# Patient Record
Sex: FEMALE | Race: WHITE
[De-identification: names, ages, dates, MRNs, and addresses within clinical notes are randomized per-mention and may not be internally consistent; named-entity substitution may affect disease eponyms.]

---

## 2018-03-06 ENCOUNTER — HOSPITAL ENCOUNTER (OUTPATIENT)
Dept: HOSPITAL 80 - CED | Age: 73
Setting detail: OBSERVATION
LOS: 1 days | Discharge: HOME | End: 2018-03-07
Attending: INTERNAL MEDICINE | Admitting: INTERNAL MEDICINE
Payer: COMMERCIAL

## 2018-03-06 DIAGNOSIS — J96.01: ICD-10-CM

## 2018-03-06 DIAGNOSIS — D69.6: ICD-10-CM

## 2018-03-06 DIAGNOSIS — Z87.891: ICD-10-CM

## 2018-03-06 DIAGNOSIS — Z85.810: ICD-10-CM

## 2018-03-06 DIAGNOSIS — Z82.49: ICD-10-CM

## 2018-03-06 DIAGNOSIS — J10.1: Primary | ICD-10-CM

## 2018-03-06 DIAGNOSIS — E86.9: ICD-10-CM

## 2018-03-06 DIAGNOSIS — D72.819: ICD-10-CM

## 2018-03-06 DIAGNOSIS — E87.1: ICD-10-CM

## 2018-03-06 LAB — PLATELET # BLD: 125 10^3/UL (ref 150–400)

## 2018-03-06 PROCEDURE — G0378 HOSPITAL OBSERVATION PER HR: HCPCS

## 2018-03-06 PROCEDURE — 92610 EVALUATE SWALLOWING FUNCTION: CPT

## 2018-03-06 PROCEDURE — 71046 X-RAY EXAM CHEST 2 VIEWS: CPT

## 2018-03-06 RX ADMIN — OSELTAMIVIR PHOSPHATE SCH MG: 75 CAPSULE ORAL at 19:41

## 2018-03-06 NOTE — EDPHY
H & P


Stated Complaint: c/o Flu like s/s with SOB inc. since Sat


Time Seen by Provider: 03/06/18 12:47


HPI/ROS: 





CHIEF COMPLAINT:  Cough and fever





History by patient and daughter





HISTORY OF PRESENT ILLNESS:  72-year-old woman with a remote history of mouth 

cancer related to smoking 20 years ago no prior history of asthma or COPD 

presents complaining of 3 days of cough occasionally productive of yellow sputum

, general fatigue and well as and fever.  She denies any runny nose or sore 

throat.  She has had a decreased appetite.  She denies any nausea or vomiting.  

There has been no diarrhea or rash.  She has no known ill contacts.  She did 

not get a flu shot this year.  She does have somewhat of a chronic cough that 

her daughter states this is clearly worse and different now.  There is no 

pleuritic chest pain or other chest pain.  There is no leg pain or swelling.





REVIEW OF SYSTEMS:


As in HPI, and all other systems reviewed and are negative


Source: Patient





- Medical/Surgical History


Hx Asthma: No


Hx Chronic Respiratory Disease: No


Hx Diabetes: No


Hx Cardiac Disease: No


Hx Renal Disease: No


Hx Cirrhosis: No


Hx Alcoholism: No


Hx HIV/AIDS: No


Hx Splenectomy or Spleen Trauma: No


Other PMH: thyroid,oral ca,RA.  surg-elsy





- Social History


Smoking Status: Former smoker





- Physical Exam


Exam: 





General Appearance:  Alert, speaking full sentences, nontoxic-appearing.


Head: normocephalic, atraumatic


Eyes:  Pupils equal and round, reactive to light, no pallor or injection.  

Extraocular movements intact


Mouth:  Mucous membranes moist.


Respiratory:  Normal, effort, lungs with diffuse wheezes and rhonchi 

inspiratory and expiratory


Cardiovascular:  Regular rate and rhythm. S1, S2, no murmurs, gallops or rubs 

appreciated


Gastrointestinal:  Abdomen is soft and nontender, no masses, bowel sounds 

normal.


Back: No CVA tenderness, no bony tenderness


Neurological:  Awake, alert and oriented x 3, no pronator drift, normal gait, 

no pronator drift


Skin:  Warm and dry, no rashes.


Musculoskeletal:   No deformities or tenderness. Full range of motion


Extremities: no edema, no tenderness, DP2+ bilat


Psychiatric:  Patient has normal affect, there is no agitation.


Constitutional: 


 Initial Vital Signs











Temperature (C)  37 C   03/06/18 12:54


 


Heart Rate  88   03/06/18 12:54


 


Respiratory Rate  18 03/06/18 12:54


 


Blood Pressure  137/62 H  03/06/18 12:54


 


O2 Sat (%)  95   03/06/18 12:54








 











O2 Delivery Mode               Room Air














Allergies/Adverse Reactions: 


 





No Known Allergies Allergy (Verified 11/05/15 13:37)


 








Home Medications: 














 Medication  Instructions  Recorded


 


NK [No Known Home Meds]  03/06/18














Medical Decision Making





- Diagnostics


Imaging Results: 


 Imaging Impressions





Chest X-Ray  03/06/18 13:07


Impression:


1. No active cardiopulmonary disease seen.


2. Stable marked compression of T12. Prior DEXA scan revealed osteoporosis.











Imaging: I viewed and interpreted images myself


ED Course/Re-evaluation: 





72-year-old woman presents with fever malaise and cough and wheezing.  X-ray 

shows no evidence of acute infiltrate or pneumonia.  Flu swab is positive for 

influenza B. Patient is started on Tamiflu.  Patient was given a DuoNeb in the 

emergency department.  On re-evaluation she continued to be wheezy in her 

oxygen saturation was noted to be borderline at around 90% on room air.  

Patient was given a 2nd albuterol neb which she felt did not change her 

respiratory status and again she continue with persistent wheezes and 

borderline oxygen saturations of 89-90% on room air.  Labs were done and 

chemistries were unremarkable.  There is no hyponatremia.  Patient was given a 

3rd albuterol neb again with minimal change.  Again her oxygen saturation 

dropped as low as 87% when she got up to go to the bathroom.  They would 

improved to about 90 91% when she was at rest but as soon as she did any 

exertion including trying to drink her Gatorade it would drop back down into 

the 80s.  Given that the patient's wheezing was not improving with the 

nebulizer treatments and was persistently hypoxic we will go ahead and admit 

her to the hospital for further evaluation treatment.  I discussed this with 

the patient and her daughter who were agreeable to this plan.  I discussed the 

case with Dr. Mena at [facility] who agrees 

to admission and accepts the patient for transfer.





- Data Points


Laboratory Results: 


 Laboratory Results





 03/06/18 14:40 





 











  03/06/18 03/06/18 03/06/18





  14:40 14:40 12:50


 


WBC    3.37 10^3/uL L 10^3/uL  





    (3.80-9.50)  


 


RBC    4.63 10^6/uL 10^6/uL  





    (4.18-5.33)  


 


Hgb    13.6 g/dL g/dL  





    (12.6-16.3)  


 


POC Hgb  13.6 gm/dL gm/dL    





   (12.6-16.3)   


 


Hct    39.9 % %  





    (38.0-47.0)  


 


POC Hct  40 % %    





   (38-47)   


 


MCV    86.2 fL fL  





    (81.5-99.8)  


 


MCH    29.4 pg pg  





    (27.9-34.1)  


 


MCHC    34.1 g/dL g/dL  





    (32.4-36.7)  


 


RDW    14.1 % %  





    (11.5-15.2)  


 


Plt Count    125 10^3/uL L 10^3/uL  





    (150-400)  


 


MPV    10.7 fL fL  





    (8.7-11.7)  


 


Neut % (Auto)    54.0 % %  





    (39.3-74.2)  


 


Lymph % (Auto)    30.3 % %  





    (15.0-45.0)  


 


Mono % (Auto)    13.9 % H %  





    (4.5-13.0)  


 


Eos % (Auto)    1.2 % %  





    (0.6-7.6)  


 


Baso % (Auto)    0.3 % %  





    (0.3-1.7)  


 


Nucleat RBC Rel Count    0.0 % %  





    (0.0-0.2)  


 


Absolute Neuts (auto)    1.82 10^3/uL 10^3/uL  





    (1.70-6.50)  


 


Absolute Lymphs (auto)    1.02 10^3/uL 10^3/uL  





    (1.00-3.00)  


 


Absolute Monos (auto)    0.47 10^3/uL 10^3/uL  





    (0.30-0.80)  


 


Absolute Eos (auto)    0.04 10^3/uL 10^3/uL  





    (0.03-0.40)  


 


Absolute Basos (auto)    0.01 10^3/uL L 10^3/uL  





    (0.02-0.10)  


 


Absolute Nucleated RBC    0.00 10^3/uL 10^3/uL  





    (0-0.01)  


 


Immature Gran %    0.3 % %  





    (0.0-1.1)  


 


Immature Gran #    0.01 10^3/uL 10^3/uL  





    (0.00-0.10)  


 


POC Sodium  134 mEq/L L mEq/L    





   (135-145)   


 


POC Potassium  3.9 mEq/L mEq/L    





   (3.3-5.0)   


 


POC Chloride  99 mEq/L mEq/L    





   ()   


 


POC BUN  14 mg/dL mg/dL    





   (7-23)   


 


POC Creatinine  1.0 mg/dL mg/dL    





   (0.6-1.0)   


 


POC Glucose  116 mg/dL H mg/dL    





   ()   


 


Influenza A,B Rapid      POSITIVE FOR FLU B  H 





     (NEGATIVE) 











Medications Given: 


 








Discontinued Medications





Albuterol (Proventil Neb)  3 ml IH EDNOW ONE


   Stop: 03/06/18 14:22


   Last Admin: 03/06/18 14:31 Dose:  3 ml


Albuterol/Ipratropium (Duoneb)  3 ml IH EDNOW ONE


   Stop: 03/06/18 13:09


   Last Admin: 03/06/18 13:11 Dose:  3 ml


Sodium Chloride (Ns)  500 mls @ 1,000 mls/hr IV EDNOW ONE


   PRN Reason: Protocol


   Stop: 03/06/18 14:50


   Last Admin: 03/06/18 14:33 Dose:  500 mls


Oseltamivir Phosphate (Tamiflu)  75 mg PO EDNOW ONE


   Stop: 03/06/18 13:27


   Last Admin: 03/06/18 13:49 Dose:  75 mg





Point of Care Test Results: 


 











  03/06/18





  14:40


 


POC Sodium  134 L


 


POC Potassium  3.9


 


POC Chloride  99


 


POC BUN  14


 


POC Creatinine  1.0


 


POC Glucose  116 H














Departure





- Departure


Disposition: [facility] Inpatient Acute


Clinical Impression: 


 Influenza B, Hypoxia





Condition: Fair


Referrals: 


Unknown,Unknown [Primary Care Provider] - As per Instructions

## 2018-03-06 NOTE — GHP
[f 
rep st]



                                                            HISTORY AND PHYSICAL





DATE OF ADMISSION:  2018



CHIEF COMPLAINT:  Influenza B.



HISTORY:  Done by patient and daughter.



HISTORY OF PRESENT ILLNESS:  A 72-year-old female with a remote history of 
squamous cell carcinoma of tongue, 20-30 pack-year tobacco, presenting with 
upper respiratory infection symptoms.  Says she felt like she was getting a 
cold on Friday that included a cough with clear sputum.  She has felt feverish 
with chills and night sweats.  Has felt so fatigued that she cannot get out of 
bed.  She has had myalgias.  She has had minimal food to eat over the past 5 
days.  Intermittent diarrhea, describes more as loose.  No blood in that.  
Denies any ill contacts.  Reports increased shortness of breath over the last 
couple days, especially with walking.  Denies chest pain.



REVIEW OF SYSTEMS:  I completed a 10-point review of systems, negative except 
as noted in HPI.



PAST MEDICAL HISTORY:  Squamous cell carcinoma of tongue, status post XRT and 
surgery.



PAST SURGICAL HISTORY:  , bilateral shoulders, cholecystectomy, tongue 
surgery.



FAMILY HISTORY:  Dad with 4 MI's, throat cancer.  Mother was healthy,  of 
old age.



SOCIAL HISTORY:  Lives in [place] alone, but daughter is close by.  Smoked 25-
30 years at less than a pack a day.  No alcohol.  Occasional marijuana.



HOME MEDICATIONS:  Tums.



PHYSICAL EXAMINATION:  VITAL SIGNS:  Temperature 36.6, blood pressure 150/82, 
heart rate in the 80s, respirations 18.  Reported in low 80s in room air at 
[facility], now 94%.  GENERAL:  Sitting up in bed in no acute 
distress.  HEENT:  PERRLA.  Oropharynx mildly red, but no exudates.  CV:  
Regular rate and rhythm.  LUNGS:  Decreased breath sounds throughout.  A few 
expiratory wheezes on the left.  ABDOMEN:  Soft, nontender, nondistended.  
Positive bowel sounds.  :  No Lomas.  MUSCULOSKELETAL:  5/5 upper and lower 
extremity strength.  SKIN:  Poor skin turgor.  NEURO:  2 through 12 intact.  
PSYCH:  Alert and oriented x3.



LABORATORY DATA:  WBC 3.3, hemoglobin 13, hematocrit 39, platelets 125.  Sodium 
134, potassium 3.9, chloride 99, BUN 14, creatinine 1, glucose 116.



RADIOLOGY:  Chest x-ray as personally reviewed by me:  Hyperexpanded lung 
fields.  No opacity.



MICRO:  Positive influenza B.



ASSESSMENT AND PLAN:  

1.  Acute hypoxic respiratory failure:  Secondary to influenza B and tobacco 
history.  Will treat supportively with Tessalon Perles and Tamiflu.

2.  Suspected chronic obstructive pulmonary disease:  She does have mild 
wheezing on exam.  Quit smoking 20 years ago.  Declined prednisone.  Will 
continue DuoNeb here.

3.  Influenza B:  Tamiflu.

4.  Mild hypovolemic hyponatremia:  She is dry on exam.  Will give gentle IV 
fluids overnight.  

5.  Thrombocytopenia:  Mild.  May be secondary to acute illness.  Will repeat 
in the morning.  No evidence of bleeding.  

6.  History of squamous cell carcinoma:  Status post XRT and surgery.  

7.  Deep venous thrombosis prophylaxis:  Lovenox.

8.  Diet:  Regular.

DISPOSITION:  Patient warrants inpatient admission given acute hypoxic 
respiratory failure warranting pulse ox and DuoNeb..  May discharge in the 
morning if clinically improved.





Job #:  [id number]

MTDD

## 2018-03-07 VITALS — RESPIRATION RATE: 16 BRPM

## 2018-03-07 VITALS
OXYGEN SATURATION: 95 % | DIASTOLIC BLOOD PRESSURE: 80 MMHG | HEART RATE: 64 BPM | TEMPERATURE: 97.7 F | SYSTOLIC BLOOD PRESSURE: 126 MMHG

## 2018-03-07 LAB — PLATELET # BLD: 131 10^3/UL (ref 150–400)

## 2018-03-07 RX ADMIN — OSELTAMIVIR PHOSPHATE SCH MG: 75 CAPSULE ORAL at 07:20

## 2018-03-07 NOTE — GDS
[f rep st]



                                                             DISCHARGE SUMMARY





DISCHARGE DIAGNOSES:  

1.  Influenza B.

2.  Acute hypoxemic respiratory failure.

3.  Hypovolemic hyponatremia.

4.  Thrombocytopenia and leukopenia.

5.  Possible chronic obstructive pulmonary disease.



HISTORY OF PRESENT ILLNESS:  Briefly, the patient is a 72-year-old female with a remote history of sq
uamous cell carcinoma of the tongue.  She presented with upper respiratory infection symptoms, and it
 was found that she had influenza B.  Today she is feeling markedly better and is anxious to be disch
arged.



HOSPITAL COURSE:  

1.  Influenza B.  Will continue supportive treatment with Tamiflu and Tessalon Perles for her cough. 
 I reviewed her chest x-ray, shows nothing acute noted.

2.  Acute hypoxemic respiratory failure, resolved.

3.  Hypovolemic hyponatremia, resolved.

4.  Thrombocytopenia and leukopenia.  This could be due to her acute illness.  Further followup with 
her PCP.

5.  Possible chronic obstructive pulmonary disease.  She has an extensive history of smoking.



DISCHARGE CONDITION:  Stable.  Blood pressure is 126/80.  Heart rate is 64.  Respiratory rate is 16. 
 O2 sats on room air are 94%, temperature 36.5 Celsius.



DISCHARGE MEDICATIONS:  Please see the EMR.



DISCHARGE INSTRUCTIONS:  

1.  To take Tamiflu as instructed.

2.  If she develops worsening fever, chills, chest pain, or shortness of breath, return to the ER.

3.  To follow up with her PCP in regard to her low platelet count and low white blood cell count for 
followup.





Job #:  [id number]

## 2018-03-07 NOTE — HOSPPROG
Hospitalist Progress Note


Assessment/Plan: 





Patient is a 72-year-old female with remote history of squamous cell carcinoma 

of the tongue.  She presented with upper respiratory infection symptoms was 

noted that she had the influenza B. Today is my 1st encounter with the patient 

chart reviewed





* influenza B


-patient much improved today will discharge home and have her follow up with 

her primary care provider


-reviewed chest xray, nothing acute noted





* acute hypoxemic respiratory failure


-resolved





* hypovolemic hyponatremia


-resolved





* thrombocytopenia and leukopenia


-possibly from acute illness





*possible COPD


-long hx of smoking





*Plan: dc home, patient could not sleep last night


Subjective: Jane is feeling much improved today.


Objective: 


 Vital Signs











Temp Pulse Resp BP Pulse Ox


 


 36.5 C   64   16   126/80 H  95 


 


 03/07/18 07:58  03/07/18 07:58  03/07/18 07:58  03/07/18 07:58  03/07/18 07:58








 Laboratory Results





 03/07/18 05:38 





 03/07/18 05:38 





 











 03/06/18 03/07/18 03/08/18





 05:59 05:59 05:59


 


Intake Total  1300 


 


Balance  1300 














- Physical Exam


Constitutional: no apparent distress, appears nourished, not in pain


Eyes: PERRL


Ears, Nose, Mouth, Throat: hearing normal


Cardiovascular: regular rate and rhythym


Respiratory: no respiratory distress, clear to auscultation


Gastrointestinal: normoactive bowel sounds


Skin: warm


Musculoskeletal: full muscle strength


Neurologic: AAOx3


Psychiatric: interacting appropriately





ICD10 Worksheet


Patient Problems: 


 Problems











Problem Status Onset


 


Hypoxia Acute  


 


Influenza B Acute

## 2019-03-05 ENCOUNTER — HOSPITAL ENCOUNTER (OUTPATIENT)
Dept: HOSPITAL 80 - CED | Age: 74
Setting detail: OBSERVATION
LOS: 1 days | Discharge: HOME HEALTH | End: 2019-03-06
Attending: INTERNAL MEDICINE | Admitting: INTERNAL MEDICINE
Payer: COMMERCIAL

## 2019-03-05 DIAGNOSIS — E03.9: ICD-10-CM

## 2019-03-05 DIAGNOSIS — Z92.3: ICD-10-CM

## 2019-03-05 DIAGNOSIS — B97.81: ICD-10-CM

## 2019-03-05 DIAGNOSIS — J44.0: ICD-10-CM

## 2019-03-05 DIAGNOSIS — J44.1: Primary | ICD-10-CM

## 2019-03-05 DIAGNOSIS — Z87.891: ICD-10-CM

## 2019-03-05 DIAGNOSIS — Z85.810: ICD-10-CM

## 2019-03-05 DIAGNOSIS — E86.1: ICD-10-CM

## 2019-03-05 DIAGNOSIS — J20.8: ICD-10-CM

## 2019-03-05 LAB
INR PPP: 1.01 (ref 0.83–1.16)
PROTHROMBIN TIME: 12.9 SEC (ref 12–15)

## 2019-03-05 PROCEDURE — 96365 THER/PROPH/DIAG IV INF INIT: CPT

## 2019-03-05 PROCEDURE — 99285 EMERGENCY DEPT VISIT HI MDM: CPT

## 2019-03-05 PROCEDURE — G0378 HOSPITAL OBSERVATION PER HR: HCPCS

## 2019-03-05 PROCEDURE — 96376 TX/PRO/DX INJ SAME DRUG ADON: CPT

## 2019-03-05 PROCEDURE — 96375 TX/PRO/DX INJ NEW DRUG ADDON: CPT

## 2019-03-05 PROCEDURE — 97165 OT EVAL LOW COMPLEX 30 MIN: CPT

## 2019-03-05 PROCEDURE — 71046 X-RAY EXAM CHEST 2 VIEWS: CPT

## 2019-03-05 RX ADMIN — IPRATROPIUM BROMIDE AND ALBUTEROL SULFATE SCH: .5; 3 SOLUTION RESPIRATORY (INHALATION) at 21:19

## 2019-03-05 RX ADMIN — IPRATROPIUM BROMIDE AND ALBUTEROL SULFATE SCH ML: .5; 3 SOLUTION RESPIRATORY (INHALATION) at 15:36

## 2019-03-05 NOTE — EDPHY
H & P


Time Seen by Provider: 19 10:08


HPI/ROS: 





HPI


Cough, shortness of breath, achy, diarrhea.





73-year-old female by private vehicle with her daughter.  This patient has a 

history of COPD.  She is a former heavy smoker.  Quit about 25 years ago.  She 

states that on Friday evening she started feeling ill.  She states that she 

started feeling more short of breath with associated dry nonproductive cough 

which has been worsening since that time.  She also reports having fatigue, 

subjective fevers, muscle aches and joint aches as well as diarrhea.  No nausea 

or vomiting.  No ill contacts.  She does have a nebulizer machine at home which 

she is supposed to use for her COPD but has not used this.  





ROS:





Constitutional:  As above.


Eyes:  No discharge.  No changes in vision.


ENT:  No sore throat.  No nasal congestion or rhinorrhea.


Respiratory:  As above.


Cardiac:  No chest pain, no palpitations.


Gastrointestinal:  No abdominal pain, no vomiting, as above.


Genitourinary:  No hematuria.  No dysuria or increased frequency with urination.


Musculoskeletal:  No back pain.  No neck pain.  As above.


Skin:  No rashes.


Neurological:  No headache.  No focal weakness or altered sensation.





Past medical history:  Tongue surgery, , bilateral shoulder surgery, 

cholecystectomy.  Squamous cell carcinoma of the tongue.  COPD.





Social history:  She currently lives alone but her daughters live nearby and 

are involved with her care.  Former smoker as above.  No alcohol.





Physical Exam:





General Appearance:  Alert, intermittent dry hacking cough, she is not in 

distress.  This patient is responding to questions appropriately and in full 

sentences.  This patient appears well-hydrated and well-nourished.


Eyes:  Pupils equal and round no pallor or injection.  No lid edema, erythema 

or injection.


ENT, Mouth:  Mucous membranes are moist.  The pharyngeal tissues are 

unremarkable.  No edema or swelling.  No asymmetry suggestive of abscess.  No 

erythema or exudates.  No cervical, submandibular, submental lymphadenopathy.  

No stridor on auscultation of her neck.


Respiratory:  She is not retracting, frequent dry raspy cough, wheezing 

throughout all lung fields, worse on inhalation, diminished air movement 

bilaterally, tachypneic at 26. 


Cardiovascular:  Regular rate and rhythm.  Borderline tachycardia.  No murmur 

appreciated.


Gastrointestinal:  Abdomen is soft and nontender, no masses, bowel sounds 

normal.  No focal tenderness at McBurney's point.  No Harp sign.


Neurological:  Motor sensory function is grossly intact.  Cranial nerves are 

normal.  Gait is normal.


Skin:  Warm and dry, no rashes.


Musculoskeletal:  Neck is supple and nontender.


Extremities are symmetrical.  All joints range without pain or impingement.


Psychiatric:  No agitation.  No depression.





Database:





EKG:





Imaging:





Chest x-ray PA and lateral; the cardiac mediastinal silhouette is unremarkable.

  No evidence of infiltrate or pneumothorax.  Findings consistent with 

bronchitis/airway disease noted.  No other acute cardiopulmonary disease 

process noted.  Interpreted by me.





Procedures:





Emergency department course:





Triage vital signs reviewed.  She is tachycardic at 110, tachypneic at 26, 

pulse oximetry low at 90% on room air.  Patient meets initial criteria for 

sepsis.  Some of this SIRS criteria however likely secondary to COPD 

exacerbation.  IV placed.  Patient placed on a cardiac monitor.  Patient placed 

on nasal cannula oxygen at 3 L with pulse oximetries coming up to the mid 90s.  

She will be started on IV normal saline with 500 cc to be given over the next 

hour.  She will be given DuoNeb x2, 125 mg of IV Solu-Medrol and 2 g of IV 

magnesium initially for COPD exacerbation/reactive airway disease therapy.  

Influenza testing to be obtained as well as respiratory pathogen panel.





11:20 a.m., the patient was re-evaluated, she is more comfortable now.  Repeat 

pulmonary exam she is moving better air.  She is not tachypneic.  However she 

remains tachycardic at 115.  We did get her up to go to the bathroom.  On room 

air oxygen her pulse oximetry dropped into the mid 80s.  She also became more 

tachycardic in the 120s to 130s.  I discussed the results of her emergency 

department workup with her and her daughter.  I advised admission to our 

hospitalist service for further evaluation and management of her illness.  Both 

she and her daughter endorse.  Hospitalist paged.  She was started on a 3rd 

DuoNeb.





Influenza-negative.





11:30 a.m., spoke with on-call hospitalist Dr. Cho. Case discussed in 

detail with him.  He accepts this patient for admission to the hospitalist 

service.  The patient will be taken to the [facility] by private vehicle 

with her daughter.  We did discuss going by ambulance but she declines this.  

In my professional opinion she understands the risks of declining ambulance 

transport.  Her remaining emergency department course under my care has been 

uneventful.  She was transferred in stable and improved condition with her 

daughter who is driving.  I have filled out the transfer paperwork.








Differential Diagnosis:





The differential diagnosis on this patient includes but is not limited to 

influenza, viral bronchitis, COPD exacerbation, reactive airway disease.  This 

represents a partial list of diagnoses considered.  These considerations are 

based on history, physical exam, past history, reassessment and diagnostic 

testing.


Smoking Status: Former smoker


Constitutional: 


 Initial Vital Signs











Temperature (C)  36.9 C   19 10:15


 


Heart Rate  110 H  19 10:15


 


Respiratory Rate  26 H  19 10:15


 


Blood Pressure  155/95 H  19 10:15


 


O2 Sat (%)  90 L  19 10:15








 











O2 Delivery Mode               Nasal Cannula


 


O2 (L/minute)                  2














Allergies/Adverse Reactions: 


 





No Known Allergies Allergy (Verified 19 10:15)


 








Home Medications: 














 Medication  Instructions  Recorded


 


NK [No Known Home Meds]  19














Medical Decision Making





- Diagnostics


Imaging Results: 


 Imaging Impressions





Chest X-Ray  19 10:20


Impression: Mild peribronchial thickening which can be seen with airways disease

/bronchitis.


 


 














- Data Points


Laboratory Results: 


 











  19





  10:43 10:42


 


POC Sodium    136 mEq/L mEq/L





    (135-145) 


 


POC Potassium    4.1 mEq/L mEq/L





    (3.3-5.0) 


 


POC Chloride    101.0 mEq/L mEq/L





    () 


 


POC Total CO2    23 mEq/L mEq/L





    (22-31) 


 


POC BUN    12 mg/dL mg/dL





    (7-23) 


 


POC Creatinine    1.3 mg/dL H mg/dL





    (0.6-1.0) 


 


POC Glucose    110 mg/dL H mg/dL





    () 


 


POC Lactic Acid Berry  1.7 mmol/L mmol/L  





   (0.7-2.1)  


 


POC Calcium    9.8 mg/dL mg/dL





    (8.5-10.4) 


 


POC Total Bilirubin    1.0 mg/dL mg/dL





    (0.1-1.4) 


 


POC AST    40 IU/L IU/L





    (14-46) 


 


POC ALT    19 IU/L IU/L





    (9-52) 


 


POC Alk Phosphatase    77 IU/L IU/L





    () 


 


POC Total Protein    8.8 g/dL H g/dL





    (6.3-8.2) 


 


POC Albumin    4.7 g/dL g/dL





    (3.5-5.0) 











Medications Given: 


 








Discontinued Medications





Albuterol/Ipratropium (Duoneb)  6 ml IH EDNOW ONE


   Stop: 19 10:21


   Last Admin: 19 10:27 Dose:  6 ml


Albuterol/Ipratropium (Duoneb)  3 ml IH EDNOW ONE


   Stop: 19 11:46


   Last Admin: 19 12:05 Dose:  3 ml


Sodium Chloride (Ns)  500 mls @ 1,000 mls/hr IV EDNOW ONE


   PRN Reason: Protocol


   Stop: 19 10:49


   Last Admin: 19 10:39 Dose:  500 mls


Magnesium Sulfate (Magnesium Sulf 2 Gm (Premix))  50 mls @ 50 mls/hr IV EDNOW 

ONE


   Stop: 19 11:25


   Last Admin: 19 10:58 Dose:  50 mls


Methylprednisolone Sodium Succinate (Solu-Medrol)  125 mg IVP EDNOW ONE


   Stop: 19 10:21


   Last Admin: 19 10:39 Dose:  125 mg





Point of Care Test Results: 


 CBC











CBC Collection Date            19


 


CBC Collection Time            10:35


 


WBC                            8.9


 


RBC                            5.6


 


HGB                            16.5


 


HCT                            48.9


 


PLT                            202


 


Neut #                         7.27


 


Neut                           81.7


 


LYMPH #                        0.78


 


LYMPH                          8.8


 


MCV                            87.3











 Chemistry











  19





  10:42


 


POC Sodium  136 mEq/L mEq/L





   (135-145) 


 


POC Potassium  4.1 mEq/L mEq/L





   (3.3-5.0) 


 


POC Chloride  101.0 mEq/L mEq/L





   () 


 


POC Total CO2  23 mEq/L mEq/L





   (22-31) 


 


POC BUN  12 mg/dL mg/dL





   (7-23) 


 


POC Creatinine  1.3 mg/dL H mg/dL





   (0.6-1.0) 


 


POC Glucose  110 mg/dL H mg/dL





   () 


 


POC Calcium  9.8 mg/dL mg/dL





   (8.5-10.4) 


 


POC Total Bilirubin  1.0 mg/dL mg/dL





   (0.1-1.4) 


 


POC AST  40 IU/L IU/L





   (14-46) 


 


POC ALT  19 IU/L IU/L





   (9-52) 


 


POC Alk Phosphatase  77 IU/L IU/L





   () 


 


POC Total Protein  8.8 g/dL H g/dL





   (6.3-8.2) 


 


POC Albumin  4.7 g/dL g/dL





   (3.5-5.0) 








 Blood Gas/Lactic Acid-Venous











  19





  10:43


 


POC Lactic Acid Berry  1.7 mmol/L mmol/L





   (0.7-2.1) 








 Influenza PCR











Flu Nasal Swab Collection Date 19


 


Flu Nasal Swab Collection Time 10:20


 


Influenza A Result             Not Detected


 


Influenza B Result             Not Detected

















Departure





- Departure


Disposition: [facility] Inpatient Acute


Clinical Impression: 


 Hypoxia, COPD exacerbation, Bronchitis

## 2019-03-06 VITALS — SYSTOLIC BLOOD PRESSURE: 144 MMHG | DIASTOLIC BLOOD PRESSURE: 83 MMHG

## 2019-03-06 LAB — PLATELET # BLD: 174 10^3/UL (ref 150–400)

## 2019-03-06 RX ADMIN — IPRATROPIUM BROMIDE AND ALBUTEROL SULFATE SCH: .5; 3 SOLUTION RESPIRATORY (INHALATION) at 06:10

## 2019-03-06 RX ADMIN — IPRATROPIUM BROMIDE AND ALBUTEROL SULFATE SCH ML: .5; 3 SOLUTION RESPIRATORY (INHALATION) at 09:48

## 2019-03-06 NOTE — GDS
[f rep st]



                                                             DISCHARGE SUMMARY





DISCHARGE DIAGNOSES:  

1.  Acute hypoxemic respiratory failure.

2.  Chronic obstructive pulmonary disease.

3.  Hypokalemia. 

4.  History of squamous cell carcinoma of the tongue.



HISTORY AND HOSPITAL COURSE:  Briefly, the patient is a 73-year-old female with a history of COPD and
 tongue cancer, who presented with worsening shortness of breath and nonproductive cough, general fat
igue and malaise.  She was admitted and treated with supportive therapy.  She is feeling markedly bet
ter.  She will be discharged home and follow up with her PCP.



HOSPITAL COURSE BY PROBLEM:  

1.  Acute hypoxemic respiratory failure.  This was due to human metapneumovirus and COPD.  She was ta
chypneic on admission.  She is markedly better today.

2.  COPD.  To continue steroids.

3.  Hypovolemia, resolved.

4.  History of squamous cell carcinoma.  At the time, this occurred 25 years ago.  She was treated wi
th XRT and surgery.



CONDITION:  Stable.



VITAL SIGNS:  Blood pressure is 144/83, heart rate is 96, respiratory rate of 20, O2 sats on 2 L are 
96%.  Temperature is 36.8 celsius.



MEDICATIONS AT DISCHARGE:  Please see the EMR.



DISCHARGE INSTRUCTIONS:  

1.  To have her PCP follow up with her blood cultures.  These are pending.

2.  To take the prednisone as directed in the morning with food.

3.  Oxygen will be delivered to her home.



TIME SPENT:  Greater than 30 minutes discharging and coordinating the patient's care.





Job #:  [id number]

## 2019-03-06 NOTE — PDHOMEO2F
Home Oxygen Face to Face


Home Orders: 


I certify that a physician or a nurse practitioner or physician's assistant has 

had a face-to-face encounter with this patient on the date of this order due to 

the diagnosis listed, which relates to the primary reason the patient requires 

home oxygen. Alternative treatments have been tried, or considered, and deemed 

ineffective. It is anticipated that supplemental oxygen will result in 

improvement with treatment.





Home oxygen qualifying diagnosis: copd exacerbation


Home oxygen secondary diagnosis: human metapneumovirus


SpO2 on room air (%): 85%


Frequency of home oxygen needed: continuous


Home oxygen liters per minute: 2


Home oxygen delivery device: nasal cannula


Concentrator: Yes


E-tanks for mobility and back up: Yes


If ordering portable O2, is the patient mobile in the home?: Yes


I certify that, based on these findings, the home oxygen is medically necessary 

for this patient for the following length of time.





Length of time home oxygen needed: 99 years

## 2019-03-06 NOTE — ASDISCHSUM
----------------------------------------------

Discharge Information

----------------------------------------------

Plan Status:Home with Home Health                    Medically Cleared to Leave:03/05/2019

Discharge Date:03/06/2019 03:59 PM                   CM D/C Disposition:

ADT D/C Disposition:Home Health Service              Projected Discharge Date:03/06/2019 11:00 AM

Transportation at D/C:                               Discharge Delay Reason:

Follow-Up Date:03/06/2019 11:00 AM                   Discharge Slot:

Final Diagnosis:

----------------------------------------------

Placement Information

----------------------------------------------

Referral Type:*Home Health Care Services             Referral ID:[id number]

Provider Name:[facility] (formerly [facility])

[address]               Phone [phone]

Address 2:                                           Fax [phone]

City:[place]                                      Selection Factors:

State:[place]

 

----------------------------------------------

Patient Contact Information

----------------------------------------------

Contact Name:KAMLESH                             Relationship:Daughter

Address:[address]                             Home [phone]

                                                     Work [phone]

City:[place]                                       Alternate Phone:

State/Zip Code:[place] [address]                              Email:

----------------------------------------------

Financial Information

----------------------------------------------

Financial Class:Medicare

Primary Plan Desc:MEDICARE OUTPATIENT                Primary Plan Number:[id number]

Secondary Plan Desc:[facility]                             Secondary Plan Number:[id number]

 

 

----------------------------------------------

Assessment Information

----------------------------------------------

----------------------------------------------

LACE

----------------------------------------------

LACE

 

Length of stay for            Answers:  1 day                                 

current admission                                                             

Acuity / Level of             Answers:  No                                    

Care: Did the patient                                                         

have an inpatient                                                             

admission?                                                                    

Comorbidities - select        Answers:  Chronic pulmonary disease             

all that apply                                                                

                                        Other                         Notes:  Hypothyroid

# of Emergency department     Answers:  1-2                                   

visits in the last 6                                                          

months                                                                        

Score: 5

 

Date Signed:  03/06/2019 04:11 PM

Electronically Signed By:CHRISTIANO Fragoso

 

 

----------------------------------------------

[facility] CM Progress Note

----------------------------------------------

CM Note

 

CM Note                       

Notes:

Pt is a 74 y/o female admitted for copd exacerbation, hypoxia and bronchitis. Pt is being d/c'd 

today. Pts case discussed w/ Crys Davila NP. CM met w/ pt for dispo planning. Pt would like to 


have HC, PT and OT. Referral sent to [facility]. [facility] is able to accept. Dc orders sent. CM 

available for changes.







Plan: [facility]; PT, OT

 

Date Signed:  03/06/2019 11:47 AM

Electronically Signed By:CHRISTIANO Fragoso

 

 

----------------------------------------------

Intervention Information

----------------------------------------------

Intervention Type:*Incorrect Registration            Date of Service:03/05/2019 02:44 PM

Patient Type:Inpatient                               Staff Member:TABATHA Galaviz Courtney

Hours:                                               Discipline:

Severity:                                            Comment:

Intervention Type:*MOON-Signed                       Date of Service:03/06/2019 10:56 AM

Patient Type:Observation                             Staff Member:Stella Darden

Hours:                                               Discipline:

Severity:                                            Comment:

## 2019-03-06 NOTE — HOSPPROG
Hospitalist Progress Note


Assessment/Plan: 





72 y/o female w/ hx of COPD and tongue cancer s/p XRT presents with worsening 

shortness of breath, non-productive cough, general fatigue and malaise.   First 

encounter, chart reviewed





*acute hypoxemic respiratory failure


-due to human metapneumovirus and copd


-was tachypneic on admission


-room air sats around mid 80's w activity


-reviewed telemetry and she has been in sinus rhythm w some bouts of tachycardia





*COPD


-steroids





*hypovolemia


-resolved





*hx of squamous cell carcinoma of tongue


-s/p XRT and surgery


-occurred 25 years ago





*plan: dc later today if continues to feel ok, will need home O2 and steroids, 

encourage Jane to walk around and see that she is feeling well enough to go 

home





Subjective: Jane is feeling much better today.


Objective: 


 Vital Signs











Temp Pulse Resp BP Pulse Ox


 


 36.7 C   81   14   144/83 H  94 


 


 03/06/19 08:28  03/06/19 09:53  03/06/19 09:53  03/06/19 08:28  03/06/19 09:53








 Laboratory Results





 03/06/19 04:35 





 03/06/19 04:35 





 











 03/05/19 03/06/19 03/07/19





 05:59 05:59 05:59


 


Intake Total  1460 


 


Balance  1460 








 











PT  12.9 SEC (12.0-15.0)   03/05/19  10:35    


 


INR  1.01  (0.83-1.16)   03/05/19  10:35    














- Physical Exam


Constitutional: no apparent distress, appears nourished


Eyes: PERRL


Ears, Nose, Mouth, Throat: hearing normal


Cardiovascular: regular rate and rhythym


Respiratory: no respiratory distress, reduced air movement (bases, otherwise CTA

)


Skin: warm


Musculoskeletal: generalized weakness


Neurologic: AAOx3


Psychiatric: interacting appropriately





ICD10 Worksheet


Patient Problems: 


 Problems











Problem Status Onset


 


Bronchitis Acute  


 


COPD exacerbation Acute  


 


Hypoxia Acute  


 


Influenza B Acute

## 2019-03-06 NOTE — PDIAF
- Diagnosis


Diagnosis: copd exacerbation, human metapneumovirus


Code Status: Full Code





- Medication Management


Discharge Medications: electronically signed and located in the Home Medication 

List.





- Orders


Services needed: Home Care, Registered Nurse, Physical Therapy, Occupational 

Therapy


Home Care Face to Face: I certify that this patient was under my care and that 

I had the required face-to-face encounter meeting the encounter requirements on 

the discharge day.  My findings support the fact that the patient is homebound 

as defined in


Home Care Face to Face Continued: CMS Chapter 7 Medicare Benefits Manual 30.1.1

, The condition of the patient is such that there exists a normal inability to 

leave home and consequently, leaving home would require a considerable and 

taxing effort.


Isolation Type: Contact Isolation, Droplet Isolation


Diet Recommendation: no restrictions on diet


Diet Texture: Regular Texture Diet


Additional Instructions: 


take prednisone in the morning w food


an inhaler has been ordered for you if needed for shortness of breath


oxygen will be delivered to your home


f/u with your primary care provider in 1-2 weeks





- Follow Up Care


Current Providers and Referrals: 


Lilia Olvera MD [Primary Care Provider] - As per Instructions

## 2019-03-06 NOTE — ASMTLACE
LACE

 

Length of stay for            Answers:  1 day                                 

current admission                                                             

Acuity / Level of             Answers:  No                                    

Care: Did the patient                                                         

have an inpatient                                                             

admission?                                                                    

Comorbidities - select        Answers:  Chronic pulmonary disease             

all that apply                                                                

                                        Other                         Notes:  Hypothyroid

# of Emergency department     Answers:  1-2                                   

visits in the last 6                                                          

months                                                                        

Score: 5

 

Date Signed:  03/06/2019 04:11 PM

Electronically Signed By:CHRISTIANO Fragoso

## 2019-03-06 NOTE — ASMTCMCOM
CM Note

 

CM Note                       

Notes:

Pt is a 74 y/o female admitted for copd exacerbation, hypoxia and bronchitis. Pt is being d/c'd 

today. Pts case discussed w/ Crys Davila NP. CM met w/ pt for dispo planning. Pt would like to 


have HC, PT and OT. Referral sent to [facility]. [facility] is able to accept. Dc orders sent. CM 

available for changes.







Plan: [facility]; PT, OT

 

Date Signed:  03/06/2019 11:47 AM

Electronically Signed By:CHRISTIANO Fragoso

## 2019-03-08 ENCOUNTER — HOSPITAL ENCOUNTER (INPATIENT)
Dept: HOSPITAL 80 - FED | Age: 74
LOS: 6 days | Discharge: NURSING, REHAB OR LONG-TERM CARE | DRG: 189 | End: 2019-03-14
Attending: FAMILY MEDICINE | Admitting: NURSE PRACTITIONER
Payer: COMMERCIAL

## 2019-03-08 DIAGNOSIS — R13.10: ICD-10-CM

## 2019-03-08 DIAGNOSIS — Z87.891: ICD-10-CM

## 2019-03-08 DIAGNOSIS — Z85.810: ICD-10-CM

## 2019-03-08 DIAGNOSIS — J96.01: Primary | ICD-10-CM

## 2019-03-08 DIAGNOSIS — B97.81: ICD-10-CM

## 2019-03-08 DIAGNOSIS — I10: ICD-10-CM

## 2019-03-08 DIAGNOSIS — J06.9: ICD-10-CM

## 2019-03-08 DIAGNOSIS — Z79.52: ICD-10-CM

## 2019-03-08 DIAGNOSIS — Z79.51: ICD-10-CM

## 2019-03-08 DIAGNOSIS — E03.9: ICD-10-CM

## 2019-03-08 DIAGNOSIS — J44.1: ICD-10-CM

## 2019-03-08 DIAGNOSIS — R33.9: ICD-10-CM

## 2019-03-08 LAB — PLATELET # BLD: 248 10^3/UL (ref 150–400)

## 2019-03-08 PROCEDURE — G0378 HOSPITAL OBSERVATION PER HR: HCPCS

## 2019-03-08 RX ADMIN — IPRATROPIUM BROMIDE AND ALBUTEROL SULFATE SCH ML: .5; 3 SOLUTION RESPIRATORY (INHALATION) at 22:32

## 2019-03-08 RX ADMIN — SODIUM CHLORIDE SCH MLS: 900 INJECTION, SOLUTION INTRAVENOUS at 15:43

## 2019-03-08 RX ADMIN — AZITHROMYCIN MONOHYDRATE SCH MLS: 500 INJECTION, POWDER, LYOPHILIZED, FOR SOLUTION INTRAVENOUS at 15:43

## 2019-03-08 RX ADMIN — IPRATROPIUM BROMIDE AND ALBUTEROL SULFATE SCH ML: .5; 3 SOLUTION RESPIRATORY (INHALATION) at 18:13

## 2019-03-08 RX ADMIN — IPRATROPIUM BROMIDE AND ALBUTEROL SULFATE SCH ML: .5; 3 SOLUTION RESPIRATORY (INHALATION) at 14:38

## 2019-03-08 RX ADMIN — GUAIFENESIN SCH: 600 TABLET, EXTENDED RELEASE ORAL at 16:24

## 2019-03-08 RX ADMIN — GUAIFENESIN SCH: 600 TABLET, EXTENDED RELEASE ORAL at 19:34

## 2019-03-08 NOTE — EDPHY
H & P


Time Seen by Provider: 03/08/19 10:14


HPI/ROS: 


HPI:  This is a 73-year-old female who presents with 





Chief Complaint:  Shortness of breath





Location:  Chest


Quality:  Dyspnea


Duration:  1-3 hours prior to arrival


Signs and Symptoms:  + shortness of breath at rest, + shortness of breath on 

exertion, + productive cough, no chest pain, no palpitations, no lower 

extremity edema, + wheezing, no orthopnea, no paroxysmal nocturnal dyspnea, no 

fever, no injury/trauma, no hemoptysis, no carpal pedal spasms


Timing:  Acute on chronic


Severity:  Moderate to severe


Context:  Patient arrives via EMS with complaints of worsening shortness of 

breath, productive cough since waking up this morning.  She reports that she 

was in this hospital from 03/05 236 for respiratory failure, COPD, viral 

illness.  She is on her 3rd day of oral steroids.  She reports that yesterday 

she only drank chicken broth and ate a few saltines.  She feels that she is 

dehydrated.  She believes that her respiratory status is worse than her 

baseline.  She received her O2 concentrator yesterday and has been using 2 L 

continuous oxygen.  She complains that she cannot catch her breath or take a 

deep breath.  She is unable to speak in complete sentences.


Modifying Factors:  Prednisone, inhalers, oxygen





Comment: 








ROS:  A comprehensive 10 system review of systems is otherwise negative aside 

from elements mentioned in the history of present illness. 





MEDICAL/SURGICAL/SOCIAL HISTORY: 


Medical history:  oral ca, RA, COPD, supplemental oxygen dependent, hypothyroid

  


Surgical history:  Cholecystectomy


Social history:  Former smoker.  Retired.  Lives alone.








CONSTITUTIONAL:  Moderate distress, nebulizer in place, only able to speak in 

broken sentences, awake and alert


HEENT: Atraumatic and normocephalic, PERRL, EOMI.  Nares patent; no rhinorrhea;

  no nasal mucosal edema. Tympanic membranes clear. Oropharynx clear, tongue 

deformity noted from cancer, no exudate and dry oral mucosa.  Airway patent.  

No lymphadenopathy.  No meningismus.


Cardiovascular: Normal S1/S2, tachycardia, regular rhythm, without murmur rub 

or gallop.


PULMONARY/CHEST:  Symmetrical and nontender.  Inspiratory and expiratory 

wheezing throughout.  poor air movement. + accessory muscle usage.  Loose wet 

cough noted.  Tachypnea.  Prolonged expiratory phase.


ABDOMEN:  Soft, nondistended, nontender, no rebound, no guarding, no peritoneal 

signs, no masses or organomegaly. No CVAT.


EXTREMITIES:  2/2 pulses, strength 5/5, no deformities, no clubbing, no 

cyanosis or edema.


NEUROLOGICAL: no focal neuro deficits.  GCS 15.


SKIN: Warm and dry, pallor, no erythema. no rash.  Good capillary refill. 


  





Source: Patient, Family, Old records


Exam Limitations: Clinical condition





- Medical/Surgical History


Hx Asthma: No


Hx Chronic Respiratory Disease: Yes


Hx Diabetes: No


Hx Cardiac Disease: No


Hx Renal Disease: No


Hx Cirrhosis: No


Hx Alcoholism: No


Hx HIV/AIDS: No


Hx Splenectomy or Spleen Trauma: No


Other PMH: oral ca, RA, COPD, hypothyroid.  surg-elsy





- Social History


Smoking Status: Former smoker


Constitutional: 


 Initial Vital Signs











Temperature (C)  36.5 C   03/08/19 10:18


 


Heart Rate  113 H  03/08/19 10:18


 


Respiratory Rate  27 H  03/08/19 10:18


 


Blood Pressure  145/94 H  03/08/19 10:18


 


O2 Sat (%)  92   03/08/19 10:18








 











O2 Delivery Mode               Room Air


 


O2 (L/minute)                  2














Allergies/Adverse Reactions: 


 





No Known Allergies Allergy (Verified 03/05/19 10:15)


 








Home Medications: 














 Medication  Instructions  Recorded


 


Multivitamins [Multivitamin (*)] 1 each PO DAILY 03/05/19


 


Tears/Dextran 70/Hypromellose 1 drop EACHEYE Q2 PRN 03/05/19





[Natural Balance Tears (*)]  


 


Albuterol [Proventil Inhaler HFA 1 - 2 puffs IH Q4H #1 mdi 03/06/19





(*)]  


 


predniSONE 40 mg PO DAILY #12 tablet 03/06/19














Medical Decision Making





- Diagnostics


Imaging Results: 


 Imaging Impressions





Chest X-Ray  03/08/19 10:19


Impression: Bronchitis and minimal bibasilar atelectasis. No pneumonia.











ED Course/Re-evaluation: 


Vital signs reviewed and show tachycardia and hypoxia on room air.  Placed on 

cardiac monitor and supplemental oxygen.


Moderate respiratory distress noted


IV access, laboratory studies, chest x-ray, blood culture, lactic acid, 

medications ordered


IV access obtained, 500 cc normal saline, IV Solu-Medrol 125 mg, DuoNeb x2


1035:  ED decision to consult for admission for acute on chronic hypoxemic 

respiratory failure, COPD exacerbation.  Spoke with hospitalist, Crys, 

kindly agrees to admit patient and provide further care.


1055:  Labs reviewed.  WBC 14 K, lactic acid 2.6, potassium 4.4, creatinine 0.8

, magnesium 2.4


Ordered another 1.5 L normal saline to meet sepsis protocol of IV fluid 30 mL/

kg.  Repeat lactic acid ordered in 3 hr


1103: CXR radiology read: Bronchitis and minimal bibasilar atelectasis. No 

pneumonia.











This patient was seen under the supervision of my secondary supervising 

physician.  I evaluated care for this patient with a 10.  Discussed this 

patient with Dr. Jimenez.  





Differential Diagnosis: 


Shortness of breath including but not limited to pulmonary infectious process, 

COPD, asthma, pulmonary embolus and congestive heart failure.








- Data Points


Laboratory Results: 


 Laboratory Results





 03/08/19 10:30 





 03/08/19 10:30 





 











  03/08/19 03/08/19 03/08/19





  10:40 10:40 10:30


 


WBC      





    


 


RBC      





    


 


Hgb      





    


 


Hct      





    


 


MCV      





    


 


MCH      





    


 


MCHC      





    


 


RDW      





    


 


Plt Count      





    


 


MPV      





    


 


Neut % (Auto)      





    


 


Lymph % (Auto)      





    


 


Mono % (Auto)      





    


 


Eos % (Auto)      





    


 


Baso % (Auto)      





    


 


Nucleat RBC Rel Count      





    


 


Absolute Neuts (auto)      





    


 


Absolute Lymphs (auto)      





    


 


Absolute Monos (auto)      





    


 


Absolute Eos (auto)      





    


 


Absolute Basos (auto)      





    


 


Absolute Nucleated RBC      





    


 


Immature Gran %      





    


 


Immature Gran #      





    


 


RBC/WBC/PLT Morphology      





    


 


Platelet Estimate      





    


 


D-Dimer  1.01 ug/mLFEU H ug/mLFEU    





   (0.00-0.50)   


 


VBG Lactic Acid    2.6 mmol/L H mmol/L  





    (0.7-2.1)  


 


Sodium      142 mEq/L mEq/L





     (135-145) 


 


Potassium      4.4 mEq/L mEq/L





     (3.5-5.2) 


 


Chloride      106 mEq/L mEq/L





     () 


 


Carbon Dioxide      25 mEq/l mEq/l





     (22-31) 


 


Anion Gap      11 mEq/L mEq/L





     (6-14) 


 


BUN      20 mg/dL mg/dL





     (7-23) 


 


Creatinine      0.8 mg/dL mg/dL





     (0.6-1.0) 


 


Estimated GFR      > 60 





    


 


Glucose      94 mg/dL mg/dL





     () 


 


Calcium      9.7 mg/dL mg/dL





     (8.5-10.4) 


 


Magnesium      2.4 mg/dL H mg/dL





     (1.6-2.3) 


 


NT-Pro-B Natriuret Pep      308 pg/mL H pg/mL





     (0-125) 














  03/08/19





  10:30


 


WBC  14.13 10^3/uL H 10^3/uL





   (3.80-9.50) 


 


RBC  5.96 10^6/uL H 10^6/uL





   (4.18-5.33) 


 


Hgb  17.3 g/dL H g/dL





   (12.6-16.3) 


 


Hct  53.0 % H %





   (38.0-47.0) 


 


MCV  88.9 fL fL





   (81.5-99.8) 


 


MCH  29.0 pg pg





   (27.9-34.1) 


 


MCHC  32.6 g/dL g/dL





   (32.4-36.7) 


 


RDW  15.6 % H %





   (11.5-15.2) 


 


Plt Count  248 10^3/uL 10^3/uL





   (150-400) 


 


MPV  10.6 fL fL





   (8.7-11.7) 


 


Neut % (Auto)  75.0 % H %





   (39.3-74.2) 


 


Lymph % (Auto)  15.4 % %





   (15.0-45.0) 


 


Mono % (Auto)  8.1 % %





   (4.5-13.0) 


 


Eos % (Auto)  0.1 % L %





   (0.6-7.6) 


 


Baso % (Auto)  0.4 % %





   (0.3-1.7) 


 


Nucleat RBC Rel Count  0.0 % %





   (0.0-0.2) 


 


Absolute Neuts (auto)  10.60 10^3/uL H 10^3/uL





   (1.70-6.50) 


 


Absolute Lymphs (auto)  2.18 10^3/uL 10^3/uL





   (1.00-3.00) 


 


Absolute Monos (auto)  1.14 10^3/uL H 10^3/uL





   (0.30-0.80) 


 


Absolute Eos (auto)  0.01 10^3/uL L 10^3/uL





   (0.03-0.40) 


 


Absolute Basos (auto)  0.06 10^3/uL 10^3/uL





   (0.02-0.10) 


 


Absolute Nucleated RBC  0.00 10^3/uL 10^3/uL





   (0-0.01) 


 


Immature Gran %  1.0 % %





   (0.0-1.1) 


 


Immature Gran #  0.14 10^3/uL H 10^3/uL





   (0.00-0.10) 


 


RBC/WBC/PLT Morphology  TNP 





  


 


Platelet Estimate  TNP 





  


 


D-Dimer  





  


 


VBG Lactic Acid  





  


 


Sodium  





  


 


Potassium  





  


 


Chloride  





  


 


Carbon Dioxide  





  


 


Anion Gap  





  


 


BUN  





  


 


Creatinine  





  


 


Estimated GFR  





  


 


Glucose  





  


 


Calcium  





  


 


Magnesium  





  


 


NT-Pro-B Natriuret Pep  





  











Medications Given: 


 








Discontinued Medications





Albuterol/Ipratropium (Duoneb)  3 ml IH EDNOW ONE


   Stop: 03/08/19 10:20


   Last Admin: 03/08/19 10:36 Dose:  3 ml


Sodium Chloride (Ns)  500 mls @ 1,000 mls/hr IV EDNOW ONE


   PRN Reason: Protocol


   Stop: 03/08/19 10:48


   Last Admin: 03/08/19 10:36 Dose:  500 mls


Sodium Chloride (Ns)  1,000 mls @ 0 mls/hr IV EDNOW ONE; Wide Open


   PRN Reason: Protocol


   Stop: 03/08/19 11:12


   Last Admin: 03/08/19 11:21 Dose:  1,000 mls


Sodium Chloride (Ns)  500 mls @ 0 mls/hr IV EDNOW ONE; Wide Open


   PRN Reason: Protocol


   Stop: 03/08/19 11:12


   Last Admin: 03/08/19 11:21 Dose:  500 mls


Methylprednisolone Sodium Succinate (Solu-Medrol)  125 mg IVP EDNOW ONE


   Stop: 03/08/19 10:20


   Last Admin: 03/08/19 10:36 Dose:  125 mg








Departure





- Departure


Disposition: [facility]


Clinical Impression: 


 Acute on chronic respiratory failure with hypoxemia, COPD with exacerbation





Condition: Fair

## 2019-03-08 NOTE — ASMTCMCOM
CM Note

 

CM Note                       

Notes:

Reviewed chart. Pt presented to the Emergency Department for shortness of breath, dyspnea. History 

includes COPD with supplemental oxygen, oral cancer, RA, and hypothyroid. Pt is retired and lives 

alone in [place]. Her dghtr is her emergency contact. Pt admitted for further evaluation and 

treatment. Discharge plan remains unclear at this time. Anticipate pt will likely discharge home 

independently when medically stable. CM will continue to follow for any potential needs.

 

Discharge Plan: Likely independent

 

Date Signed:  03/08/2019 04:24 PM

Electronically Signed By:Angelic Barth RN

## 2019-03-08 NOTE — GHP
[f 
rep st]



                                                            HISTORY AND PHYSICAL





DATE OF ADMISSION:  2019



CHIEF COMPLAINT:  Worsening shortness of breath.



HISTORY OF PRESENT ILLNESS:  The patient is a 73-year-old female who was 
recently discharged from [facility] on  for COPD and viral 
illness.  She was treated with steroids.  On discharge, she said she was 
feeling markedly better and was eating and drinking, was able to sleep well.  
Last night, she woke up with severe shortness of breath at rest, with exertion.
  She is complaining of a tight chest and a nonproductive cough.  She describes 
that she is unable to get the secretions up.  She is unable to take a deep 
breath.  During my interview, she is able to speak in complete sentences, but 
was given IV steroids.  She is also complaining of some chest pain on the left 
chest wall area.  It does not radiate.  It has been ongoing since this morning. 
No associated nausea.  On this admission, she is tachycardic, hypoxic and 
tachypneic.  A chest x-ray was performed, which showed bronchitis and minimal 
bibasilar atelectasis.  No pneumonia.  In regard to other health problems, she 
has problems with chronic urinary retention.  She also has a history of 
hemorrhoids.



PAST MEDICAL HISTORY:  

1.  Squamous cell carcinoma of the tongue, status post XRT and surgery.

2.  COPD.

3.  Influenza B last year in 2018.

4.  Human metapneumovirus 2019.



PAST SURGICAL HISTORY:  

1.  .

2.  Cholecystectomy.

3.  Tongue surgery.

4.  Bilateral shoulder surgery.



FAMILY HISTORY:  Her father had 4 MIs and throat cancer.  Her mom  of old 
age.



SOCIAL HISTORY:  She lives on her own.  She has a daughter who lives close by 
to her.  She has smoked for 25-30 years at less than a pack a day.  She does 
not drink alcohol.



ALLERGIES:  No known allergies.



HOME MEDICATIONS:  Include prednisone 40 mg daily.  Natural balance tears 1 
drop each eye p.r.n., albuterol inhaler 1-2 puffs q.4 hours and multivitamin 1 
tab daily.



REVIEW OF SYSTEMS:  A 10-point review of system was performed and was negative 
other than pertinent positives in HPI and past medical history.



PHYSICAL EXAMINATION:  GENERAL:  The patient is a 73-year-old female who 
appears to be in respiratory distress.  VITAL SIGNS:  Blood pressure is 145/99, 
heart rate of 113, respiratory rate 26, O2 saturation on 6 L is 92%, 
temperature is 36.5 Celsius.  EYES:  Pupils are equal and reactive.  EOMs are 
intact.  No conjunctival injection noted.  ENT:  Normal ears.  Hearing intact.  
Normal lips.  Oral airway is dry.  NECK:  Trachea is midline.  CARDIOVASCULAR:  
She tachycardic.  No murmurs, rubs, or gallops noted.  CHEST:  Lungs, she is 
tachypneic.  She is using her accessory muscles to breathe.  She is coughing 
throughout much of my interview and has difficulty catching her breath.  She 
has rhonchi as well as expiratory wheezes scattered throughout.  ABDOMEN:  Soft
, not tender.  Round and large.  SKIN:  No rashes or ulcers noted.  
MUSCULOSKELETAL:  She has normal gait.  Equal upper and lower extremity 
strength.  PSYCHIATRIC:  She is alert and oriented, extremely anxious.  She 
appears to have normal judgment, insight and normal memory.



DATA:  CBC shows a white blood cell count of 14.13, hemoglobin 15.3, hematocrit 
of 53, platelet count of 248, neutrophil percent 75.  D-dimer is elevated at 
1.01.  Lactic acid was 2.6 earlier today; after hydration, it is 1.9.  
Chemistry sodium is 142, potassium 4.4, CO2 25, BUN of 20, creatinine 0.8, 
glucose of 94, calcium 9.7. 



Chest x-ray was performed which I evaluated and reviewed myself.  This notes 
bronchitis and minimal bibasilar atelectasis.  No infiltrate is noted. 



I reviewed the patient's care with her physician assistant in the emergency 
room.



ASSESSMENT/PLAN:  

1.  Severe chronic obstructive pulmonary disease exacerbation.  She has 
significant wheezing on exam.  Will start her on scheduled DuoNeb.  Will add IV 
steroids and start her on Azithromycin.  

2.  Acute hypoxemic respiratory failure.  This is due to COPD exacerbation.  
Also, she appears quite ill.  Will check a respiratory PCR again to rule out 
that she does not have the influenza. She has an elevated D-dimer and 
associated tachypnea and tachycardia.  Will hold off ordering the CTA until she 
stabilizes, but this could also be one of the etiologies of her hypoxemic 
respiratory failure.

3.  Chest pain that has been ongoing all day.  Will get an EKG and do serial 
troponin.

4.  Leukocytosis.  I suspect this is steroid induced. Will follow.

5.  History of squamous cell carcinoma.  She is status post XRT and surgery.

6.  Deep venous thrombosis prophylaxis.  High risk.  Lovenox to be initiated in 
the morning.

7.  Code status:  Full.



DISPOSITION:  The patient warrants inpatient admission given her acute hypoxic 
respiratory failure as well as needing IV steroids and dual medications.





Job #:  [id number]

MTDD

## 2019-03-09 RX ADMIN — GUAIFENESIN SCH: 600 TABLET, EXTENDED RELEASE ORAL at 20:48

## 2019-03-09 RX ADMIN — IPRATROPIUM BROMIDE AND ALBUTEROL SULFATE SCH ML: .5; 3 SOLUTION RESPIRATORY (INHALATION) at 11:33

## 2019-03-09 RX ADMIN — ENOXAPARIN SODIUM SCH MG: 100 INJECTION SUBCUTANEOUS at 10:44

## 2019-03-09 RX ADMIN — AZITHROMYCIN MONOHYDRATE SCH MLS: 500 INJECTION, POWDER, LYOPHILIZED, FOR SOLUTION INTRAVENOUS at 09:57

## 2019-03-09 RX ADMIN — IPRATROPIUM BROMIDE AND ALBUTEROL SULFATE SCH ML: .5; 3 SOLUTION RESPIRATORY (INHALATION) at 16:52

## 2019-03-09 RX ADMIN — THERA TABS SCH: TAB at 10:16

## 2019-03-09 RX ADMIN — IPRATROPIUM BROMIDE AND ALBUTEROL SULFATE SCH ML: .5; 3 SOLUTION RESPIRATORY (INHALATION) at 21:48

## 2019-03-09 RX ADMIN — GUAIFENESIN SCH: 600 TABLET, EXTENDED RELEASE ORAL at 10:16

## 2019-03-09 RX ADMIN — SODIUM CHLORIDE SCH MLS: 900 INJECTION, SOLUTION INTRAVENOUS at 05:40

## 2019-03-09 RX ADMIN — DEXTROSE MONOHYDRATE AND SODIUM CHLORIDE SCH MLS: 5; .9 INJECTION, SOLUTION INTRAVENOUS at 14:33

## 2019-03-09 RX ADMIN — IPRATROPIUM BROMIDE AND ALBUTEROL SULFATE SCH ML: .5; 3 SOLUTION RESPIRATORY (INHALATION) at 04:28

## 2019-03-09 NOTE — ASMTCMCOM
CM Note

 

CM Note                       

Notes:

Pt admitted for COPD exacerbation in the setting of viral URI. Per SLP pt also has moderate to 

severe aspiration risk. PT and OT are recommending SNF placement.  Pt was very passive and 

apathetic when asked about rehab post discharge. Spoke with guille Hoffman at pt's request regarding 

rehab. Family to decide on facility and let CM know their decision. Earliest discharge possible on 

Monday. Referrals have been sent to both [facility] and [facility] in the meantime. CM to follow. 



D/C Plan: SNF pending family decision

 

Date Signed:  03/09/2019 03:51 PM

Electronically Signed By:Pennie Sierra

## 2019-03-09 NOTE — HOSPPROG
Hospitalist Progress Note


Assessment/Plan: 





AHRF 2/2 COPD exacerbation in setting of viral URI (metapneumovirus) - 

Presented with respiratory distress.  Requiring 5-6 LPM today with ongoing 

wheezing, but respiratory status more stable.  D dimer elevated.  


   -check CTA


   -cont IV solumedrol, nebs, azithro, expectorant, supportive care


   -wean O2 as able





Dysphagia - mod to severe aspiration risk per SLP.  She has had radiation for h/

o SCC of tongue.


   -NPO, IVF's


   -hopefully swallow function will improve as she recovers from acute illness


   -dietary consult





H/O SCC of tongue - s/p chemo and radiation





Elevated TSH - query sick euthyroid


   -recommend repeat TSH in 4 weeks after resolution of acute illness





Leukocytosis - likely steroid induced





Full code





DVT PPLX - Lovenox





Dispo - cont inpt for high O2 needs, COPD exacerbation














Subjective: Pt feels a little better, breathing has slightly improved, not as 

much distress.  Still SOB with activity.  Denies CP.  Still wheezing quite a 

bit.  Unable to take po as failed swallow eval.  No fevers.


Objective: 


 Vital Signs











Temp Pulse Resp BP Pulse Ox


 


 36.6 C   77   18   150/97 H  95 


 


 03/09/19 07:43  03/09/19 07:43  03/09/19 07:43  03/09/19 07:43  03/09/19 07:43








 Microbiology











 03/08/19 15:12 Respiratory Panel (PCR) - Final





 Nasal, Sinus - Anaerobic Tube/Swab    No Organism Detected By Pcr








 











 03/08/19 03/09/19 03/10/19





 05:59 05:59 06:59


 


Intake Total  2000 


 


Balance  2000 














- Physical Exam


Constitutional: no apparent distress


Eyes: PERRL


Ears, Nose, Mouth, Throat: moist mucous membranes


Cardiovascular: regular rate and rhythym


Respiratory: no respiratory distress, reduced air movement, inspiratory crackles


Gastrointestinal: normoactive bowel sounds, soft, non-tender abdomen


Skin: warm


Musculoskeletal: full muscle strength


Neurologic: AAOx3


Psychiatric: interacting appropriately





ICD10 Worksheet


Patient Problems: 


 Problems











Problem Status Onset


 


Acute on chronic respiratory failure with hypoxemia Acute  


 


COPD with exacerbation Acute  


 


Bronchitis Acute  


 


COPD exacerbation Acute  


 


Hypoxia Acute  


 


Influenza B Acute

## 2019-03-09 NOTE — PDMN
Medical Necessity


Medical necessity: Pt meets IP criteria per MD & MCG M-100; est los >2 mn for 

eval/tx of severe COPD exacerbation w/acute hypoxemic respiratory failure, 

tachypnea & chest pain; r/o influenza; admit for further workup/monitoring, IV 

steroids, IV abx, IVFs & respiratory supportive care; hx recent 

hospitalizations for COPD & viral illness; per H&P & order 3/8/19

## 2019-03-10 RX ADMIN — IPRATROPIUM BROMIDE AND ALBUTEROL SULFATE SCH ML: .5; 3 SOLUTION RESPIRATORY (INHALATION) at 16:14

## 2019-03-10 RX ADMIN — MENTHOL SCH: 1 SPRAY TOPICAL at 09:48

## 2019-03-10 RX ADMIN — IPRATROPIUM BROMIDE AND ALBUTEROL SULFATE SCH ML: .5; 3 SOLUTION RESPIRATORY (INHALATION) at 12:00

## 2019-03-10 RX ADMIN — ACETAMINOPHEN SCH: 500 TABLET ORAL at 21:10

## 2019-03-10 RX ADMIN — AZITHROMYCIN MONOHYDRATE SCH MLS: 500 INJECTION, POWDER, LYOPHILIZED, FOR SOLUTION INTRAVENOUS at 09:47

## 2019-03-10 RX ADMIN — GUAIFENESIN SCH: 600 TABLET, EXTENDED RELEASE ORAL at 21:10

## 2019-03-10 RX ADMIN — DEXTROSE MONOHYDRATE AND SODIUM CHLORIDE SCH MLS: 5; .9 INJECTION, SOLUTION INTRAVENOUS at 00:59

## 2019-03-10 RX ADMIN — GUAIFENESIN SCH: 600 TABLET, EXTENDED RELEASE ORAL at 09:47

## 2019-03-10 RX ADMIN — DEXTROSE MONOHYDRATE AND SODIUM CHLORIDE SCH MLS: 5; .9 INJECTION, SOLUTION INTRAVENOUS at 15:27

## 2019-03-10 RX ADMIN — IPRATROPIUM BROMIDE AND ALBUTEROL SULFATE SCH ML: .5; 3 SOLUTION RESPIRATORY (INHALATION) at 06:26

## 2019-03-10 RX ADMIN — ENOXAPARIN SODIUM SCH MG: 100 INJECTION SUBCUTANEOUS at 09:47

## 2019-03-10 RX ADMIN — THERA TABS SCH: TAB at 11:02

## 2019-03-10 RX ADMIN — IPRATROPIUM BROMIDE AND ALBUTEROL SULFATE SCH ML: .5; 3 SOLUTION RESPIRATORY (INHALATION) at 21:09

## 2019-03-10 RX ADMIN — ACETAMINOPHEN SCH: 500 TABLET ORAL at 14:24

## 2019-03-10 RX ADMIN — MENTHOL SCH PATCH: 1 SPRAY TOPICAL at 06:48

## 2019-03-10 NOTE — HOSPPROG
Hospitalist Progress Note


Assessment/Plan: 





AHRF 2/2 COPD exacerbation in setting of viral URI (metapneumovirus) - 

Presented with respiratory distress. Requiring 5-6 LPM though satting 87% on 

room air, respiratory status more stable today.  D dimer elevated, CTA neg for 

PE.


   -cont IV solumedrol, wean to 60 mg IV q6h


   -cont nebs, azithro, expectorant, supportive care


   -wean O2 as able





Dysphagia - mod to severe aspiration risk per SLP.  She has had radiation for h/

o SCC of tongue.  Re-evaluated today by speech, ok to have dysphagia diet


   -cont gentle IVF's until taking better po


   -dietary following





H/O SCC of tongue - s/p chemo and radiation





T12 compression deformity - reviewed with radiologist, who notes this is chronic

, unchanged vertebral height compared to prior


   -pain control with scheduled tylenol, lidoderm patch





Elevated TSH - query sick euthyroid


   -recommend repeat TSH in 4 weeks after resolution of acute illness





Leukocytosis - likely steroid induced





Mood lability - requested behavioral health resource RN consult for tomorrow





Full code





DVT PPLX - Lovenox





Dispo - cont inpt for high O2 needs, COPD exacerbation














Subjective: Pt doing a little better today, less wheezing.  Ambulates with 

walker.  Denies CP, SOB.  Coughing a bit still with some bronchspasm.  No N/V.  

No abdominal pain.


Objective: 


 Vital Signs











Temp Pulse Resp BP Pulse Ox


 


 36.5 C   74   20   164/94 H  94 


 


 03/10/19 07:38  03/10/19 07:38  03/10/19 07:38  03/10/19 07:38  03/10/19 07:38








 











 03/09/19 03/10/19 03/11/19





 04:59 05:59 05:59


 


Intake Total   


 


Output Total   


 


Balance   














- Physical Exam


Constitutional: no apparent distress


Eyes: PERRL


Ears, Nose, Mouth, Throat: moist mucous membranes


Cardiovascular: regular rate and rhythym


Respiratory: no respiratory distress, reduced air movement, other (decreased 

exp wheezes)


Gastrointestinal: normoactive bowel sounds, soft, non-tender abdomen


Skin: warm


Musculoskeletal: full muscle strength


Neurologic: AAOx3


Psychiatric: interacting appropriately





ICD10 Worksheet


Patient Problems: 


 Problems











Problem Status Onset


 


Acute on chronic respiratory failure with hypoxemia Acute  


 


COPD with exacerbation Acute  


 


Bronchitis Acute  


 


COPD exacerbation Acute  


 


Hypoxia Acute  


 


Influenza B Acute

## 2019-03-11 RX ADMIN — ACETAMINOPHEN SCH: 500 TABLET ORAL at 22:32

## 2019-03-11 RX ADMIN — GUAIFENESIN SCH: 600 TABLET, EXTENDED RELEASE ORAL at 10:33

## 2019-03-11 RX ADMIN — DEXTROSE MONOHYDRATE AND SODIUM CHLORIDE SCH MLS: 5; .9 INJECTION, SOLUTION INTRAVENOUS at 11:15

## 2019-03-11 RX ADMIN — MENTHOL SCH: 1 SPRAY TOPICAL at 10:36

## 2019-03-11 RX ADMIN — MENTHOL SCH PATCH: 1 SPRAY TOPICAL at 12:39

## 2019-03-11 RX ADMIN — ACETAMINOPHEN SCH: 500 TABLET ORAL at 04:51

## 2019-03-11 RX ADMIN — GUAIFENESIN SCH: 600 TABLET, EXTENDED RELEASE ORAL at 22:12

## 2019-03-11 RX ADMIN — IPRATROPIUM BROMIDE AND ALBUTEROL SULFATE SCH ML: .5; 3 SOLUTION RESPIRATORY (INHALATION) at 16:38

## 2019-03-11 RX ADMIN — THERA TABS SCH: TAB at 10:31

## 2019-03-11 RX ADMIN — IPRATROPIUM BROMIDE AND ALBUTEROL SULFATE SCH ML: .5; 3 SOLUTION RESPIRATORY (INHALATION) at 21:00

## 2019-03-11 RX ADMIN — AZITHROMYCIN MONOHYDRATE SCH MLS: 500 INJECTION, POWDER, LYOPHILIZED, FOR SOLUTION INTRAVENOUS at 11:15

## 2019-03-11 RX ADMIN — ACETAMINOPHEN SCH: 500 TABLET ORAL at 14:52

## 2019-03-11 RX ADMIN — IPRATROPIUM BROMIDE AND ALBUTEROL SULFATE SCH ML: .5; 3 SOLUTION RESPIRATORY (INHALATION) at 11:04

## 2019-03-11 RX ADMIN — ENOXAPARIN SODIUM SCH MG: 100 INJECTION SUBCUTANEOUS at 10:37

## 2019-03-11 RX ADMIN — IPRATROPIUM BROMIDE AND ALBUTEROL SULFATE SCH ML: .5; 3 SOLUTION RESPIRATORY (INHALATION) at 06:00

## 2019-03-11 NOTE — HOSPPROG
Hospitalist Progress Note


Assessment/Plan: 





AHRF 2/2 COPD exacerbation in setting of viral URI (metapneumovirus) - 

Presented with respiratory distress. O2 requirement 6 LPM --> 4 LPM.  CTA neg 

for PE.


   -cont IV solumedrol


   -cont nebs, azithro, expectorant, supportive care


   -wean O2 as able





Dysphagia - initially failed swallow eval, but cleared for modified diet by 

speech yest


   -dysphagia diet


   -dietary following





H/O SCC of tongue - s/p chemo and radiation





T12 compression deformity - reviewed with radiologist, who notes this is chronic

, unchanged vertebral height compared to prior


   -pain control with scheduled tylenol, lidoderm patch





Hypertension - ?hastened by high dose steroids


   -start norvasc


   -prn hydralazine





Hypothyroidism - resume home synthroid





Leukocytosis - likely steroid induced





Mood lability - requested behavioral health resource RN consult for tomorrow





Full code





DVT PPLX - Lovenox





Dispo - cont inpt for high O2 needs, COPD exacerbation














Subjective: Pt's spirits are low.  She isn't feeling much better today.  Still 

with cough and wheezing, though less audible.  No fevers/chills.  She reports 

poor oral intake, though cleared by speech for diet.  No N/V.  No urinary 

symptoms.


Objective: 


 Vital Signs











Temp Pulse Resp BP Pulse Ox


 


 36.9 C   84   20   173/109 H  91 L


 


 03/11/19 12:00  03/11/19 12:00  03/11/19 12:00  03/11/19 12:00  03/11/19 12:00








 











 03/10/19 03/11/19 03/12/19





 05:59 05:59 05:59


 


Intake Total  1001 


 


Output Total   


 


Balance  1001 














- Physical Exam


Constitutional: no apparent distress


Eyes: PERRL


Ears, Nose, Mouth, Throat: moist mucous membranes


Cardiovascular: regular rate and rhythym


Respiratory: no respiratory distress, reduced air movement, expiratory wheeze


Gastrointestinal: normoactive bowel sounds, soft, non-tender abdomen


Skin: warm


Musculoskeletal: full muscle strength


Neurologic: AAOx3


Psychiatric: interacting appropriately





ICD10 Worksheet


Patient Problems: 


 Problems











Problem Status Onset


 


Acute on chronic respiratory failure with hypoxemia Acute  


 


COPD with exacerbation Acute  


 


Chronic Disease Mgmt/Transitional Care Acute  


 


Bronchitis Acute  


 


COPD exacerbation Acute  


 


Hypoxia Acute  


 


Influenza B Acute

## 2019-03-12 LAB — PLATELET # BLD: 240 10^3/UL (ref 150–400)

## 2019-03-12 RX ADMIN — ACETAMINOPHEN SCH: 500 TABLET ORAL at 04:00

## 2019-03-12 RX ADMIN — MENTHOL SCH: 1 SPRAY TOPICAL at 08:58

## 2019-03-12 RX ADMIN — ENOXAPARIN SODIUM SCH MG: 100 INJECTION SUBCUTANEOUS at 13:05

## 2019-03-12 RX ADMIN — IPRATROPIUM BROMIDE AND ALBUTEROL SULFATE SCH ML: .5; 3 SOLUTION RESPIRATORY (INHALATION) at 21:10

## 2019-03-12 RX ADMIN — DEXTROSE MONOHYDRATE AND SODIUM CHLORIDE SCH MLS: 5; .9 INJECTION, SOLUTION INTRAVENOUS at 09:38

## 2019-03-12 RX ADMIN — LEVOTHYROXINE SODIUM SCH: 50 TABLET ORAL at 05:47

## 2019-03-12 RX ADMIN — IPRATROPIUM BROMIDE AND ALBUTEROL SULFATE SCH ML: .5; 3 SOLUTION RESPIRATORY (INHALATION) at 16:47

## 2019-03-12 RX ADMIN — THERA TABS SCH: TAB at 09:08

## 2019-03-12 RX ADMIN — AZITHROMYCIN MONOHYDRATE SCH MLS: 500 INJECTION, POWDER, LYOPHILIZED, FOR SOLUTION INTRAVENOUS at 08:52

## 2019-03-12 RX ADMIN — IPRATROPIUM BROMIDE AND ALBUTEROL SULFATE SCH ML: .5; 3 SOLUTION RESPIRATORY (INHALATION) at 05:48

## 2019-03-12 RX ADMIN — GUAIFENESIN SCH: 600 TABLET, EXTENDED RELEASE ORAL at 20:26

## 2019-03-12 RX ADMIN — IPRATROPIUM BROMIDE AND ALBUTEROL SULFATE SCH ML: .5; 3 SOLUTION RESPIRATORY (INHALATION) at 10:51

## 2019-03-12 RX ADMIN — GUAIFENESIN SCH: 600 TABLET, EXTENDED RELEASE ORAL at 08:59

## 2019-03-12 NOTE — HOSPPROG
Hospitalist Progress Note


Assessment/Plan: 





AHRF 2/2 COPD exacerbation in setting of viral URI (metapneumovirus) - 

Presented with respiratory distress, which is improved, but still with 

significant wheezing and bronchospasm. O2 requirement 6 LPM.  CTA neg for PE.


   -s/p 5 days high dose IV solumedrol, change to prednisone 60 mg daily, 

likely warrants taper but pt reluctant to take steroids


   -cont nebs, expectorant, RT support


   -s/p 5 days azithromycin


   -wean O2 as able





Dysphagia - initially failed swallow eval, but cleared for modified diet by 

speech yest.  She has poor oral intake.


   -dysphagia diet


   -dietary following





H/O SCC of tongue - s/p chemo and radiation





T12 compression deformity - reviewed with radiologist, who notes this is chronic

, unchanged vertebral height compared to prior


   -pain control with tylenol, lidoderm patch





Hypertension - ?hastened by high dose steroids


   -cont norvasc (new Rx), prn hydralazine





Hypothyroidism - cont home synthroid





Leukocytosis - likely steroid induced (wbcs 14 --> 12K)





Mood lability - requested behavioral health resource RN consult, psych has seen 

pt





Full code





DVT PPLX - Lovenox





Dispo - cont inpt for high O2 needs, COPD exacerbation, to [facility] when 

ready for dc














Subjective: Pt doing ok.  Still coughing with significant bronchospasm and 

wheezing.  No CP or SOB at rest.  No fevers.  Not taking much po despite being 

cleared by surgery.


Objective: 


 Vital Signs











Temp Pulse Resp BP Pulse Ox


 


 37.5 C   103 H  20   154/119 H  92 


 


 03/12/19 08:05  03/12/19 08:05  03/12/19 08:05  03/12/19 08:53  03/12/19 08:05








 Laboratory Results





 03/12/19 07:34 





 03/12/19 07:34 





 











 03/11/19 03/12/19 03/13/19





 05:59 05:59 05:59


 


Intake Total 1001 2010 


 


Output Total  400 


 


Balance 1001 1610 














- Physical Exam


Constitutional: no apparent distress


Eyes: PERRL


Ears, Nose, Mouth, Throat: moist mucous membranes


Cardiovascular: regular rate and rhythym, no murmur, rub, or gallop


Respiratory: no respiratory distress, reduced air movement, expiratory wheeze


Gastrointestinal: normoactive bowel sounds, soft, non-tender abdomen


Skin: warm


Musculoskeletal: full muscle strength


Neurologic: AAOx3


Psychiatric: interacting appropriately, other (labile mood)





ICD10 Worksheet


Patient Problems: 


 Problems











Problem Status Onset


 


Acute on chronic respiratory failure with hypoxemia Acute  


 


COPD with exacerbation Acute  


 


Chronic Disease Mgmt/Transitional Care Acute  


 


Bronchitis Acute  


 


COPD exacerbation Acute  


 


Hypoxia Acute  


 


Influenza B Acute

## 2019-03-12 NOTE — ASMTCMCOM
CM Note

 

CM Note                       

Notes:

Pt to discharge to [facility], per dtr Yasmin's choice, when medically stable, likely several more 

days, per hospitalist. Updates sent to [facility]. CM to follow. 

D/C Plan: [facility]F

 

Date Signed:  03/12/2019 03:01 PM

Electronically Signed By:Pennie Sierra

## 2019-03-12 NOTE — PDCONSULT
Consultant Note: 





PSYCHIATRY CONSULTATION


late entry.


Behavioral Health consult requested 19 by hospitalist Kelly Cushing for 

recommendations around this patient's affective lability and reluctance to 

cooperate with recommendations for rehab





Pt evaluated on 3/11 with psychology student present, also spoke with RN, and 

patient's 2 daughters who came to visit, reviewed EMR, and discussed 

recommendations briefly with primary team.





Pt feels behavioral health consulted "because I'm not doing well, I'm getting 

worse, I never had blood pressure that high (205 SBP), I was better yesterday, 

now I'm out of breath again." Also reports she is concerned about aspiration, 

throat feels dry.





Briefly, pt is a 74yo CF  x 30yr retired music/ 

who lives independently and was admitted  due to worsening shortness of 

breath. She had been admitted briefly on  for COPD exacerbation and viral 

illness, started on steroids and discharged after she reported feeling acutely 

improved. RN reported pt was insistent to leave after feeling better at that 

time. 


Since in hospital, she has been receiving steroids with overall gradual 

improvement, but last night apparently was quite resistant to recommendations 

she transition through acute rehab before returning home. Daughters, who were 

present this AM, report that she didn't seem to fully understand the plan, 

rather felt she thought she was being sent off to an "old folks' home." Pt 

states she didn't misunderstand anything, but declined to elaborate, just 

stated she knew they were considering one of 2 places for her, and that she is 

agreeable to go at this time.





Pt denied any psychiatric history, no prior psychiatric treatment or 

hospitalizations, although did go to therapy for a period of time after her 

 committed suicide in . 


Pt denied any hx of or recent substance use. Although sarcastically added, "I 

could use something now."


Did smoke <1ppd x 25-30yrs.


Daughter Yasmin reported mother was addicted to pain medications Oxycodone and 

Morphine "up to 800mg" related to her tongue cancer in late , and used to 

smoke THC, but no other substance use issues or known current use.


Dtr volunteered that brother (pt's son) overdosed on Rx medications and  in 

 (mention of this caused pt to become tearful). 


Pt is a retired  and , also used to 

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 





 suicided in , daughter feels pt "has never been the same since." 


Dtr reports pt has "always been a glass 1/2-empty kind of person." And 

presently dtr describes pt as being "morose" compared to her baseline, having 

been this way since starting on steroids, which also correlates with her onset 

of respiratory illness. 





MSE:


Pt denied feeling depressed, rather just physically ill, but also not happy 

about being in the hospital. Thought process linear, goal-directed brief 

responses, often with sarcasm and dry humor with flat expression. Did not seem 

to really want, or feel she needed, any mental health involvement as she felt 

her primary issues were ongoing acute medical needs related to still feeling 

short of breath and now concerned about her blood pressure. Seemed 

appropriately concerned about her medical issues, did take blood pressure med 

when offered by RN with applesauce although initially was irritable and  





Recommendations:

## 2019-03-13 RX ADMIN — IPRATROPIUM BROMIDE AND ALBUTEROL SULFATE SCH ML: .5; 3 SOLUTION RESPIRATORY (INHALATION) at 21:22

## 2019-03-13 RX ADMIN — MENTHOL SCH: 1 SPRAY TOPICAL at 07:52

## 2019-03-13 RX ADMIN — IPRATROPIUM BROMIDE AND ALBUTEROL SULFATE SCH ML: .5; 3 SOLUTION RESPIRATORY (INHALATION) at 11:40

## 2019-03-13 RX ADMIN — LEVOTHYROXINE SODIUM SCH MCG: 50 TABLET ORAL at 05:05

## 2019-03-13 RX ADMIN — BENZONATATE SCH: 100 CAPSULE, LIQUID FILLED ORAL at 21:47

## 2019-03-13 RX ADMIN — ENOXAPARIN SODIUM SCH MG: 100 INJECTION SUBCUTANEOUS at 07:58

## 2019-03-13 RX ADMIN — BUDESONIDE SCH MG: 0.5 INHALANT RESPIRATORY (INHALATION) at 21:23

## 2019-03-13 RX ADMIN — THERA TABS SCH: TAB at 07:52

## 2019-03-13 RX ADMIN — IPRATROPIUM BROMIDE AND ALBUTEROL SULFATE SCH ML: .5; 3 SOLUTION RESPIRATORY (INHALATION) at 16:20

## 2019-03-13 RX ADMIN — IPRATROPIUM BROMIDE AND ALBUTEROL SULFATE SCH ML: .5; 3 SOLUTION RESPIRATORY (INHALATION) at 04:34

## 2019-03-13 RX ADMIN — BENZONATATE SCH: 100 CAPSULE, LIQUID FILLED ORAL at 21:46

## 2019-03-13 NOTE — HOSPPROG
Hospitalist Progress Note


Assessment/Plan: 





AHRF 2/2 COPD exacerbation in setting of viral URI (metapneumovirus) - 

Presented with respiratory distress, which is improved, but still with 

significant wheezing and bronchospasm. O2 requirement 6 LPM.  CTA neg for PE.


   -s/p 5 days high dose IV solumedrol, change to prednisone 60 mg daily, 

likely warrants taper but pt reluctant to take steroids


   -cont nebs, expectorant, RT support


   -s/p 5 days azithromycin


   -wean O2 as able


   -agrees to nebulized steroid 





Dysphagia - initially failed swallow eval, but cleared for modified diet by 

speech yest.  She has poor oral intake.


   -dysphagia diet


   -dietary following





H/O SCC of tongue - s/p chemo and radiation





T12 compression deformity - reviewed with radiologist, who notes this is chronic

, unchanged vertebral height compared to prior


   -pain control with tylenol, lidoderm patch





Hypertension - ?hastened by high dose steroids


   -cont norvasc (new Rx), prn hydralazine





Hypothyroidism - cont home synthroid





Leukocytosis - likely steroid induced (wbcs 14 --> 12K)





Mood lability - requested behavioral health resource RN consult, psych has seen 

pt





Full code


DVT PPLX - Lovenox


Dispo - cont inpt for high O2 needs, COPD exacerbation, to [facility] when 

ready for dc








Objective: 


 Vital Signs











Temp Pulse Resp BP Pulse Ox


 


 98.2 F   80   14   133/94 H  92 


 


 03/13/19 20:00  03/13/19 21:25  03/13/19 21:25  03/13/19 20:00  03/13/19 21:25








 Laboratory Results





 03/12/19 07:34 





 03/12/19 07:34 





 











 03/12/19 03/13/19 03/14/19





 11:59 11:59 11:59


 


Intake Total 2010 970 


 


Output Total 400  


 


Balance 1610 970 














ICD10 Worksheet


Patient Problems: 


 Problems











Problem Status Onset


 


Acute on chronic respiratory failure with hypoxemia Acute  


 


COPD with exacerbation Acute  


 


Chronic Disease Mgmt/Transitional Care Acute  


 


Bronchitis Acute  


 


COPD exacerbation Acute  


 


Hypoxia Acute  


 


Influenza B Acute

## 2019-03-13 NOTE — CPEKG
Test Reason : OPEN

Blood Pressure : ***/*** mmHG

Vent. Rate : 094 BPM     Atrial Rate : 096 BPM

   P-R Int : 185 ms          QRS Dur : 089 ms

    QT Int : 370 ms       P-R-T Axes : 065 010 036 degrees

   QTc Int : 463 ms

 

Sinus rhythm

Low voltage, extremity leads

Moderate artifact

 

Confirmed by Roberth Ramirez ([id number]) on 3/13/2019 8:41:41 AM

 

Referred By: Crys Davila           Confirmed By:Roberth Ramirez

## 2019-03-14 VITALS — SYSTOLIC BLOOD PRESSURE: 119 MMHG | DIASTOLIC BLOOD PRESSURE: 81 MMHG

## 2019-03-14 RX ADMIN — MENTHOL SCH: 1 SPRAY TOPICAL at 09:48

## 2019-03-14 RX ADMIN — IPRATROPIUM BROMIDE AND ALBUTEROL SULFATE SCH: .5; 3 SOLUTION RESPIRATORY (INHALATION) at 16:06

## 2019-03-14 RX ADMIN — ENOXAPARIN SODIUM SCH: 100 INJECTION SUBCUTANEOUS at 09:47

## 2019-03-14 RX ADMIN — BENZONATATE SCH: 100 CAPSULE, LIQUID FILLED ORAL at 09:47

## 2019-03-14 RX ADMIN — THERA TABS SCH: TAB at 09:48

## 2019-03-14 RX ADMIN — IPRATROPIUM BROMIDE AND ALBUTEROL SULFATE SCH ML: .5; 3 SOLUTION RESPIRATORY (INHALATION) at 09:05

## 2019-03-14 RX ADMIN — BUDESONIDE SCH MG: 0.5 INHALANT RESPIRATORY (INHALATION) at 09:05

## 2019-03-14 RX ADMIN — BENZONATATE SCH: 100 CAPSULE, LIQUID FILLED ORAL at 16:12

## 2019-03-14 RX ADMIN — IPRATROPIUM BROMIDE AND ALBUTEROL SULFATE SCH ML: .5; 3 SOLUTION RESPIRATORY (INHALATION) at 04:08

## 2019-03-14 NOTE — PDIAF
- Diagnosis


Code Status: Full Code





- Medication Management


Long Term Antibiotics: n/a


Discharge Medications: electronically signed and located in the Home Medication 

List.


PICC Care - Routine: N/A





- Orders


Diet Recommendation: no restrictions on diet


Diet Texture: Dysphagia 1 - Pureed, Nectar Thick Liquids, Meds Whole w/Liquids


Weigh Patient: weekly





- Follow Up Care


Current Providers and Referrals: 


Lilia Olvera MD [Primary Care Provider] - 3 days of d/c SNF/Rehab

## 2019-03-14 NOTE — ASMTLACE
LACE

 

Length of stay for            Answers:  4-6 days                              

current admission                                                             

Acuity / Level of             Answers:  Yes                                   

Care: Did the patient                                                         

have an inpatient                                                             

admission?                                                                    

Comorbidities - select        Answers:  Any tumor (including                  

all that apply                          lymphoma or leukemia)                 

                                        Chronic pulmonary disease             

                                        Other                         Notes:  Hypothyroid

# of Emergency department     Answers:  1-2                                   

visits in the last 6                                                          

months                                                                        

Score: 13

 

Date Signed:  03/14/2019 04:25 PM

Electronically Signed By:Allyson Perdomo RN

## 2019-03-14 NOTE — ASMTDCNOTE
Case Management Discharge

 

Discharge Order Complete?     Answers:  Yes                                   

Patient to Obtain             Answers:  Other                         Notes:  [facility]

Medications                                                                   

Transportation Arranged       Answers:  Other                         Notes:  [facility]

Transport will Pick (Date     03/14/2019 04:15 AM

& Time)                       

Faxed Final Orders            Answers:  Yes                                   

Agency/Facility Transfer      Answers:  Yes                                   

Report Printed & Faxed to                                                     

Receiving Agency                                                              

Discharge Comments            

Notes:

D/w MD, final order faxed. Rizwana sexton [facility] notified, RN to call report.

 

Date Signed:  03/14/2019 04:29 PM

Electronically Signed By:Allyson Perdomo RN

## 2019-03-15 NOTE — ASDISCHSUM
----------------------------------------------

Discharge Information

----------------------------------------------

Plan Status:SNF                                      Medically Cleared to Leave:03/14/2019

Discharge Date:03/14/2019 04:25 PM                   CM D/C Disposition:

ADT D/C Disposition:Skilled Nursing Facility         Projected Discharge Date:03/14/2019 11:00 AM

Transportation at D/C:                               Discharge Delay Reason:

Follow-Up Date:03/14/2019 11:00 AM                   Discharge Slot:

Final Diagnosis:

----------------------------------------------

Placement Information

----------------------------------------------

Referral Type:*Nursing Home/SNF                      Referral ID:[id number]

Provider Name:[facility]

Address 1:[address]                    Phone [phone]

Address 2:                                           Fax [phone]

City:[place]                                      Selection Factors:

State:[place]

 

----------------------------------------------

Patient Contact Information

----------------------------------------------

Contact Name:MARYKIRILLPERRY                             Relationship:Daughter

Address:[address]                             Home [phone]

                                                     Work [phone]

City:[place]                                     Alternate Phone:

State/Zip Code:[place] [address]                              Email:

----------------------------------------------

Financial Information

----------------------------------------------

Financial Class:Medicare

Primary Plan Desc:MEDICARE INPATIENT                 Primary Plan Number:[id number]

Secondary Plan Desc:[facility]                             Secondary Plan Number:[id number]

 

 

----------------------------------------------

Assessment Information

----------------------------------------------

----------------------------------------------

LACE

----------------------------------------------

LACE

 

Length of stay for            Answers:  4-6 days                              

current admission                                                             

Acuity / Level of             Answers:  Yes                                   

Care: Did the patient                                                         

have an inpatient                                                             

admission?                                                                    

Comorbidities - select        Answers:  Any tumor (including                  

all that apply                          lymphoma or leukemia)                 

                                        Chronic pulmonary disease             

                                        Other                         Notes:  Hypothyroid

# of Emergency department     Answers:  1-2                                   

visits in the last 6                                                          

months                                                                        

Score: 13

 

Date Signed:  03/14/2019 04:25 PM

Electronically Signed By:Allyson Perdomo RN

 

 

----------------------------------------------

[facility] CM Progress Note

----------------------------------------------

CM Note

 

CM Note                       

Notes:

Reviewed chart. Pt presented to the Emergency Department for shortness of breath, dyspnea. History 

includes COPD with supplemental oxygen, oral cancer, RA, and hypothyroid. Pt is retired and lives 

alone in [place]. Her dghtr is her emergency contact. Pt admitted for further evaluation and 

treatment. Discharge plan remains unclear at this time. Anticipate pt will likely discharge home 

independently when medically stable. CM will continue to follow for any potential needs.

 

Discharge Plan: Likely independent

 

Date Signed:  03/08/2019 04:24 PM

Electronically Signed By:Angelic Barth RN

 

 

----------------------------------------------

[facility] CM Progress Note

----------------------------------------------

CM Note

 

CM Note                       

Notes:

Pt admitted for COPD exacerbation in the setting of viral URI. Per SLP pt also has moderate to 

severe aspiration risk. PT and OT are recommending SNF placement.  Pt was very passive and 

apathetic when asked about rehab post discharge. Spoke with guille Hoffman at pt's request regarding 

rehab. Family to decide on facility and let CM know their decision. Earliest discharge possible on 

Monday. Referrals have been sent to both [facility] and [facility] in the meantime. CM to follow. 



D/C Plan: SNF pending family decision

 

Date Signed:  03/09/2019 03:51 PM

Electronically Signed By:Pennie Sierra

 

 

----------------------------------------------

[facility] CM Progress Note

----------------------------------------------

CM Note

 

CM Note                       

Notes:

Pt to discharge to [facility], per guille Hoffman's choice, when medically stable, likely several more 

days, per hospitalist. Updates sent to [facility]. [facility] to follow. 

D/C Plan: [facility]

 

Date Signed:  03/12/2019 03:01 PM

Electronically Signed By:Pennie Sierra

 

 

----------------------------------------------

Case Management Discharge Plan Note

----------------------------------------------

Case Management Discharge

 

Discharge Order Complete?     Answers:  Yes                                   

Patient to Obtain             Answers:  Other                         Notes:  [facility]

Medications                                                                   

Transportation Arranged       Answers:  Other                         Notes:  [facility]

Transport will Pick (Date     03/14/2019 04:15 AM

& Time)                       

Faxed Final Orders            Answers:  Yes                                   

Agency/Facility Transfer      Answers:  Yes                                   

Report Printed & Faxed to                                                     

Receiving Agency                                                              

Discharge Comments            

Notes:

D/w MD, final order faxed. Rizwana sexton [facility] notified, RN to call report.

 

Date Signed:  03/14/2019 04:29 PM

Electronically Signed By:Allyson Perdomo RN

 

 

----------------------------------------------

Intervention Information

----------------------------------------------

## 2019-04-03 ENCOUNTER — HOSPITAL ENCOUNTER (EMERGENCY)
Dept: HOSPITAL 80 - CED | Age: 74
Discharge: HOME | End: 2019-04-03
Payer: COMMERCIAL

## 2019-04-03 VITALS — DIASTOLIC BLOOD PRESSURE: 96 MMHG | SYSTOLIC BLOOD PRESSURE: 143 MMHG

## 2019-04-03 DIAGNOSIS — E03.9: ICD-10-CM

## 2019-04-03 DIAGNOSIS — E86.9: ICD-10-CM

## 2019-04-03 DIAGNOSIS — N39.0: Primary | ICD-10-CM

## 2019-04-03 NOTE — EDPHY
H & P


Time Seen by Provider: 04/03/19 15:29


HPI/ROS: 





Chief complaint.  Weakness, dizzy on standing





HPI.  Patient is 73-year-old female who has been lightheaded on standing for 1 

day.  She thinks she is dehydrated or this is a panic attack.  She has 

apparently not been drinking much fluids and decreased oral intake for the last 

several days.  She has no vomiting or diarrhea.  No abdominal pain.  No chest 

pain or shortness of breath or cough.  Denies fever.  No urinary symptoms.  

Patient was admitted 3/8/2019 for shortness of breath and COPD exacerbation.  

No recent travel or known exposures to Infectious Disease.  She gets 

lightheaded on standing and better on sitting.





ROS


10 systems were reviewed and negative with the exception of the elements 

mentioned in the history of present illness





Past Medical/Surgical History: 





Squamous cell cancer tongue, rheumatoid arthritis, COPD, hypothyroid, 

cholecystectomy


Social History: 





Single, nonsmoker, no alcohol


Smoking Status: Former smoker


Physical Exam: 





General Appearance:  Alert pleasant well-developed female mild distress vital 

signs show patient be afebrile.  Heart rates 100. Blood pressure 179/102


Eyes: Pupils equal and round no pallor or injection.


ENT, pharynx without injection.  Mucous membranes are moist


Respiratory:  There are no retractions, lungs are clear to auscultation.


Cardiovascular: Regular rate and rhythm.


Gastrointestinal:   Abdomen is soft and nontender, no masses, bowel sounds 

normal.


Neurological:  Awake and alert, sensory and motor exams grossly normal.


Skin: Warm and dry, no rashes.


Musculoskeletal:  Neck is supple nontender.


Extremities  symmetrical, full range of motion.


Psychiatric: Patient is oriented X 3, there is no agitation.


Constitutional: 


 Initial Vital Signs











Temperature (C)  36.7 C   04/03/19 15:22


 


Heart Rate  100   04/03/19 15:22


 


Respiratory Rate  22 H  04/03/19 15:22


 


Blood Pressure  179/102 H  04/03/19 15:22


 


O2 Sat (%)  97   04/03/19 15:22








 











O2 Delivery Mode               Room Air














Allergies/Adverse Reactions: 


 





No Known Allergies Allergy (Verified 04/03/19 15:22)


 








Home Medications: 














 Medication  Instructions  Recorded


 


Multivitamins [Multivitamin (*)] 1 each PO DAILY 03/05/19


 


Tears/Dextran 70/Hypromellose 1 drop EACHEYE Q2 PRN 03/05/19





[Natural Balance Tears (*)]  


 


Levothyroxine [Synthroid 50 mcg 50 mcg PO DAILY06 03/11/19





(*)]  


 


Acetaminophen [Tylenol  mg 1,000 mg PO Q8HRS PRN  tab 03/14/19





(*)]  


 


Albuterol [Proventil Inhaler HFA 1 - 2 puffs IH Q4H PRN #1 mdi 03/14/19





(*)]  


 


Benzonatate [Tessalon Pearles] 200 mg PO TID  cap 03/14/19


 


Budesonide [Budesonide 0.5MG/2Ml 0.5 mg NEB BID 7 Days  ampul.neb 03/14/19





Neb (*)]  


 


Lidocaine 4%/Menthol 1% [Icy Hot 1 patch TD DAILY  patch 03/14/19





Lidocaine/Menthol 4%/1% Patch (*)]  


 


Ondansetron Odt [Zofran Odt 4 mg 4 mg PO Q4HRS PRN  tab 03/14/19





(*)]  


 


Patch Removal 1 ea TD DAILY21  patch 03/14/19


 


amLODIPine BESYLATE [Norvasc 2.5 5 mg PO DAILY  tab 03/14/19





mg (*)]  


 


Cephalexin [Keflex (*)] 500 mg PO TID #21 cap 04/03/19














Medical Decision Making





- Diagnostics


Imaging Results: 


 Imaging Impressions





Chest X-Ray  04/03/19 15:34


Impression:


Peribronchial thickening suggesting bronchitis with scattered atelectasis, 

similar to the comparisons.











Procedures: 





IV normal saline.





Point of care troponin is 0.01


Chem panel normal other than mildly elevated calcium at 11.1 with normal range 

8.5 to 10.4





CBC normal





Venous lactate is 1.5





Chest x-ray shows peribronchial thickening





Urinalysis shows positive nitrites and leukocytes.  It is sent for culture and 

sensitivity


ED Course/Re-evaluation: 





Re-evaluation at 5:30 p.m..  Patient is feeling much better.  Heart rate is 

normalizing.  Patient and I discussed imaging lab EKG findings.  We discussed 

treatment plan including criteria for return importance of follow-up and 

further evaluation.  She expresses understanding and agreement





Patient is given 1st dose cephalexin in the emergency department


Differential Diagnosis: 





It appears there was some mild dehydration as patient's heart rate was 

elevated.  No evidence for sepsis or pneumonia.  She has urinary tract 

infection.  She is improved after hydration.





- Data Points


Laboratory Results: 


 











  04/03/19 04/03/19 04/03/19





  16:12 16:11 16:10


 


POC Sodium    144 mEq/L mEq/L  





    (135-145)  


 


POC Potassium    4.4 mEq/L mEq/L  





    (3.3-5.0)  


 


POC Chloride    110.0 mEq/L mEq/L  





    ()  


 


POC Total CO2    27 mEq/L mEq/L  





    (22-31)  


 


POC BUN    7 mg/dL mg/dL  





    (7-23)  


 


POC Creatinine    0.7 mg/dL mg/dL  





    (0.6-1.0)  


 


POC Glucose    94 mg/dL mg/dL  





    ()  


 


POC Lactic Acid Berry  1.5 mmol/L mmol/L    





   (0.7-2.1)   


 


POC Calcium    11.1 mg/dL H mg/dL  





    (8.5-10.4)  


 


POC Troponin I      0.01 ng/mL ng/mL





     (0.00-0.08) 











Medications Given: 


 








Discontinued Medications





Sodium Chloride (Ns)  1,000 mls @ 0 mls/hr IV ONCE ONE; Wide Open


   PRN Reason: Protocol


   Stop: 04/03/19 15:35


   Last Admin: 04/03/19 16:03 Dose:  1,000 mls





Point of Care Test Results: 


 CBC











CBC Collection Date            04/03/19


 


CBC Collection Time            16:05


 


WBC                            7.22


 


RBC                            4.58


 


HGB                            13.2


 


HCT                            41.3


 


PLT                            266


 


Neut #                         4.86


 


Neut                           67.4


 


LYMPH #                        1.4


 


LYMPH                          19.4


 


MCV                            90.2











 Chemistry











  04/03/19 04/03/19





  16:11 16:10


 


POC Sodium  144 mEq/L mEq/L  





   (135-145)  


 


POC Potassium  4.4 mEq/L mEq/L  





   (3.3-5.0)  


 


POC Chloride  110.0 mEq/L mEq/L  





   ()  


 


POC Total CO2  27 mEq/L mEq/L  





   (22-31)  


 


POC BUN  7 mg/dL mg/dL  





   (7-23)  


 


POC Creatinine  0.7 mg/dL mg/dL  





   (0.6-1.0)  


 


POC Glucose  94 mg/dL mg/dL  





   ()  


 


POC Calcium  11.1 mg/dL H mg/dL  





   (8.5-10.4)  


 


POC Troponin I    0.01 ng/mL ng/mL





    (0.00-0.08) 








 Blood Gas/Lactic Acid-Venous











  04/03/19





  16:12


 


POC Lactic Acid Berry  1.5 mmol/L mmol/L





   (0.7-2.1) 








 Urine Dip











Collection Date                04/03/19


 


Collection Time                16:20


 


Specific Gravity (1.002-1.030) 1.015


 


PH (5.0-7.5)                   8.0


 


Leukocytes (Negative)          1+


 


Nitrites (Negative)            Positive


 


Protein (Negative)             Negative


 


Glucose (Negative)             Negative


 


Ketones (Negative)             Negative


 


Urobilnogen (0.2-1.0 EU)       0.2


 


Bilirubin (Negative)           Negative


 


Blood (Negative)               Trace

















Departure





- Departure


Disposition: Home, Routine, Self-Care


Clinical Impression: 


Urinary tract infection


Qualifiers:


 Urinary tract infection type: site unspecified Hematuria presence: without 

hematuria Qualified Code(s): N39.0 - Urinary tract infection, site not specified





Condition: Good


Instructions:  Urinary Tract Infection in Women (ED)


Additional Instructions: 


Drink plenty of fluids and stay hydrated





Cephalexin 1 pill 3 times daily as antibiotic.





Return for worsening symptoms including fever, vomiting, trouble breathing, 

chest discomfort





Recheck in 2 days if not improved


Referrals: 


Heidi Garrett DO [Primary Care Provider] - 2-3 days, if not improved


Prescriptions: 


Cephalexin [Keflex (*)] 500 mg PO TID #21 cap

## 2019-04-03 NOTE — CPEKG
Test Reason : OPEN

Blood Pressure : ***/*** mmHG

Vent. Rate : 088 BPM     Atrial Rate : 088 BPM

   P-R Int : 189 ms          QRS Dur : 085 ms

    QT Int : 370 ms       P-R-T Axes : 053 031 040 degrees

   QTc Int : 448 ms

 

Sinus rhythm

 

Confirmed by Jose De Jesus Dang ([id number]) on 4/3/2019 6:26:12 PM

 

Referred By: JOSE DE JESUS DANG           Confirmed By:Jose De Jesus Dnag

## 2019-04-25 ENCOUNTER — HOSPITAL ENCOUNTER (EMERGENCY)
Age: 74
Discharge: HOME | End: 2019-04-25
Payer: COMMERCIAL

## 2019-04-25 DIAGNOSIS — E03.9: ICD-10-CM

## 2019-04-25 DIAGNOSIS — R06.02: Primary | ICD-10-CM

## 2019-04-25 DIAGNOSIS — E86.9: ICD-10-CM

## 2019-04-25 DIAGNOSIS — J44.9: ICD-10-CM

## 2019-04-25 DIAGNOSIS — F12.921: ICD-10-CM

## 2019-04-25 PROCEDURE — 96374 THER/PROPH/DIAG INJ IV PUSH: CPT

## 2019-04-25 PROCEDURE — 99284 EMERGENCY DEPT VISIT MOD MDM: CPT

## 2019-04-25 PROCEDURE — 71046 X-RAY EXAM CHEST 2 VIEWS: CPT
